# Patient Record
Sex: FEMALE | Race: WHITE | NOT HISPANIC OR LATINO
[De-identification: names, ages, dates, MRNs, and addresses within clinical notes are randomized per-mention and may not be internally consistent; named-entity substitution may affect disease eponyms.]

---

## 2018-05-03 ENCOUNTER — TRANSCRIPTION ENCOUNTER (OUTPATIENT)
Age: 48
End: 2018-05-03

## 2018-08-01 ENCOUNTER — TRANSCRIPTION ENCOUNTER (OUTPATIENT)
Age: 48
End: 2018-08-01

## 2019-02-04 PROBLEM — Z00.00 ENCOUNTER FOR PREVENTIVE HEALTH EXAMINATION: Status: ACTIVE | Noted: 2019-02-04

## 2023-07-22 ENCOUNTER — EMERGENCY (EMERGENCY)
Facility: HOSPITAL | Age: 53
LOS: 1 days | Discharge: HOME | End: 2023-07-22
Attending: EMERGENCY MEDICINE
Payer: COMMERCIAL

## 2023-07-22 VITALS
HEART RATE: 122 BPM | OXYGEN SATURATION: 95 % | TEMPERATURE: 98 F | HEIGHT: 62 IN | WEIGHT: 104.94 LBS | RESPIRATION RATE: 16 BRPM | SYSTOLIC BLOOD PRESSURE: 112 MMHG | DIASTOLIC BLOOD PRESSURE: 84 MMHG

## 2023-07-22 LAB
ALBUMIN SERPL ELPH-MCNC: 3.8 G/DL — SIGNIFICANT CHANGE UP (ref 3.3–5)
ALP SERPL-CCNC: 58 U/L — SIGNIFICANT CHANGE UP (ref 40–120)
ALT FLD-CCNC: 12 U/L — SIGNIFICANT CHANGE UP (ref 10–45)
ANION GAP SERPL CALC-SCNC: 10 MMOL/L — SIGNIFICANT CHANGE UP (ref 5–17)
APTT BLD: 33.5 SEC — SIGNIFICANT CHANGE UP (ref 27.5–35.5)
AST SERPL-CCNC: 24 U/L — SIGNIFICANT CHANGE UP (ref 10–40)
BASOPHILS # BLD AUTO: 0.04 K/UL — SIGNIFICANT CHANGE UP (ref 0–0.2)
BASOPHILS NFR BLD AUTO: 0.4 % — SIGNIFICANT CHANGE UP (ref 0–2)
BILIRUB SERPL-MCNC: 0.2 MG/DL — SIGNIFICANT CHANGE UP (ref 0.2–1.2)
BUN SERPL-MCNC: 19 MG/DL — SIGNIFICANT CHANGE UP (ref 7–23)
CALCIUM SERPL-MCNC: 9.9 MG/DL — SIGNIFICANT CHANGE UP (ref 8.4–10.5)
CHLORIDE SERPL-SCNC: 101 MMOL/L — SIGNIFICANT CHANGE UP (ref 96–108)
CO2 SERPL-SCNC: 28 MMOL/L — SIGNIFICANT CHANGE UP (ref 22–31)
CREAT SERPL-MCNC: 0.6 MG/DL — SIGNIFICANT CHANGE UP (ref 0.5–1.3)
EGFR: 109 ML/MIN/1.73M2 — SIGNIFICANT CHANGE UP
EOSINOPHIL # BLD AUTO: 0.08 K/UL — SIGNIFICANT CHANGE UP (ref 0–0.5)
EOSINOPHIL NFR BLD AUTO: 0.8 % — SIGNIFICANT CHANGE UP (ref 0–6)
GLUCOSE SERPL-MCNC: 90 MG/DL — SIGNIFICANT CHANGE UP (ref 70–99)
HCT VFR BLD CALC: 38.1 % — SIGNIFICANT CHANGE UP (ref 34.5–45)
HGB BLD-MCNC: 11.9 G/DL — SIGNIFICANT CHANGE UP (ref 11.5–15.5)
IMM GRANULOCYTES NFR BLD AUTO: 0.5 % — SIGNIFICANT CHANGE UP (ref 0–0.9)
INR BLD: 1.01 RATIO — SIGNIFICANT CHANGE UP (ref 0.88–1.16)
LYMPHOCYTES # BLD AUTO: 1.73 K/UL — SIGNIFICANT CHANGE UP (ref 1–3.3)
LYMPHOCYTES # BLD AUTO: 16.7 % — SIGNIFICANT CHANGE UP (ref 13–44)
MAGNESIUM SERPL-MCNC: 2.3 MG/DL — SIGNIFICANT CHANGE UP (ref 1.6–2.6)
MCHC RBC-ENTMCNC: 29.6 PG — SIGNIFICANT CHANGE UP (ref 27–34)
MCHC RBC-ENTMCNC: 31.2 GM/DL — LOW (ref 32–36)
MCV RBC AUTO: 94.8 FL — SIGNIFICANT CHANGE UP (ref 80–100)
MONOCYTES # BLD AUTO: 0.64 K/UL — SIGNIFICANT CHANGE UP (ref 0–0.9)
MONOCYTES NFR BLD AUTO: 6.2 % — SIGNIFICANT CHANGE UP (ref 2–14)
NEUTROPHILS # BLD AUTO: 7.82 K/UL — HIGH (ref 1.8–7.4)
NEUTROPHILS NFR BLD AUTO: 75.4 % — SIGNIFICANT CHANGE UP (ref 43–77)
NRBC # BLD: 0 /100 WBCS — SIGNIFICANT CHANGE UP (ref 0–0)
PHOSPHATE SERPL-MCNC: 4.2 MG/DL — SIGNIFICANT CHANGE UP (ref 2.5–4.5)
PLATELET # BLD AUTO: 361 K/UL — SIGNIFICANT CHANGE UP (ref 150–400)
POTASSIUM SERPL-MCNC: 4.6 MMOL/L — SIGNIFICANT CHANGE UP (ref 3.5–5.3)
POTASSIUM SERPL-SCNC: 4.6 MMOL/L — SIGNIFICANT CHANGE UP (ref 3.5–5.3)
PROT SERPL-MCNC: 7.4 G/DL — SIGNIFICANT CHANGE UP (ref 6–8.3)
PROTHROM AB SERPL-ACNC: 11.6 SEC — SIGNIFICANT CHANGE UP (ref 10.5–13.4)
RBC # BLD: 4.02 M/UL — SIGNIFICANT CHANGE UP (ref 3.8–5.2)
RBC # FLD: 13.7 % — SIGNIFICANT CHANGE UP (ref 10.3–14.5)
SODIUM SERPL-SCNC: 139 MMOL/L — SIGNIFICANT CHANGE UP (ref 135–145)
TROPONIN T, HIGH SENSITIVITY RESULT: 7 NG/L — SIGNIFICANT CHANGE UP (ref 0–51)
WBC # BLD: 10.36 K/UL — SIGNIFICANT CHANGE UP (ref 3.8–10.5)
WBC # FLD AUTO: 10.36 K/UL — SIGNIFICANT CHANGE UP (ref 3.8–10.5)

## 2023-07-22 PROCEDURE — 71275 CT ANGIOGRAPHY CHEST: CPT | Mod: 26,MA

## 2023-07-22 PROCEDURE — 74177 CT ABD & PELVIS W/CONTRAST: CPT | Mod: 26,MA

## 2023-07-22 PROCEDURE — 99223 1ST HOSP IP/OBS HIGH 75: CPT

## 2023-07-22 RX ORDER — MORPHINE SULFATE 50 MG/1
4 CAPSULE, EXTENDED RELEASE ORAL ONCE
Refills: 0 | Status: DISCONTINUED | OUTPATIENT
Start: 2023-07-22 | End: 2023-07-22

## 2023-07-22 RX ORDER — SODIUM CHLORIDE 9 MG/ML
1000 INJECTION INTRAMUSCULAR; INTRAVENOUS; SUBCUTANEOUS ONCE
Refills: 0 | Status: COMPLETED | OUTPATIENT
Start: 2023-07-22 | End: 2023-07-22

## 2023-07-22 RX ORDER — LIDOCAINE 4 G/100G
1 CREAM TOPICAL ONCE
Refills: 0 | Status: COMPLETED | OUTPATIENT
Start: 2023-07-22 | End: 2023-07-22

## 2023-07-22 RX ORDER — METHOCARBAMOL 500 MG/1
1500 TABLET, FILM COATED ORAL ONCE
Refills: 0 | Status: COMPLETED | OUTPATIENT
Start: 2023-07-22 | End: 2023-07-22

## 2023-07-22 RX ORDER — KETOROLAC TROMETHAMINE 30 MG/ML
15 SYRINGE (ML) INJECTION ONCE
Refills: 0 | Status: DISCONTINUED | OUTPATIENT
Start: 2023-07-22 | End: 2023-07-22

## 2023-07-22 RX ORDER — PANTOPRAZOLE SODIUM 20 MG/1
40 TABLET, DELAYED RELEASE ORAL ONCE
Refills: 0 | Status: COMPLETED | OUTPATIENT
Start: 2023-07-22 | End: 2023-07-22

## 2023-07-22 RX ADMIN — MORPHINE SULFATE 4 MILLIGRAM(S): 50 CAPSULE, EXTENDED RELEASE ORAL at 15:22

## 2023-07-22 RX ADMIN — MORPHINE SULFATE 4 MILLIGRAM(S): 50 CAPSULE, EXTENDED RELEASE ORAL at 14:13

## 2023-07-22 RX ADMIN — Medication 15 MILLIGRAM(S): at 21:33

## 2023-07-22 RX ADMIN — SODIUM CHLORIDE 1000 MILLILITER(S): 9 INJECTION INTRAMUSCULAR; INTRAVENOUS; SUBCUTANEOUS at 14:13

## 2023-07-22 RX ADMIN — METHOCARBAMOL 1500 MILLIGRAM(S): 500 TABLET, FILM COATED ORAL at 21:31

## 2023-07-22 RX ADMIN — PANTOPRAZOLE SODIUM 40 MILLIGRAM(S): 20 TABLET, DELAYED RELEASE ORAL at 21:34

## 2023-07-22 RX ADMIN — LIDOCAINE 1 PATCH: 4 CREAM TOPICAL at 21:31

## 2023-07-22 RX ADMIN — MORPHINE SULFATE 4 MILLIGRAM(S): 50 CAPSULE, EXTENDED RELEASE ORAL at 19:12

## 2023-07-22 NOTE — ED PROVIDER NOTE - OBJECTIVE STATEMENT
52 year old female with hx of osteoporosis, hypothyroidism, breast cancer s/p mastectomies and bilat breast implant on 7/13 comes into the ED for eval of fall last night now presenting with lower back pain. She reports feeling SOB since her surgery but recently the SOB was worse than usual. Last night she got out of bed to go to the bathroom, felt weak, her legs gave out and she fell onto her back. One of her doctors gave her a prescription for 10mg of valium and percocet which improved her pain last night. She woke up this morning in severe lower back pain. She did not take any percocet but took 10mg of valium this morning with no improvement of her pain. She denies any fevers, chills, abd pain, n/v, lower extremity numness or weakness, saddle anesthesia, bowel or bladder incontinence.

## 2023-07-22 NOTE — ED PROVIDER NOTE - PHYSICAL EXAMINATION
GENERAL: Not in acute distress, non-toxic appearing  HEAD: normocephalic, atraumatic  HEENT: EOMI, normal conjunctiva, oral mucosa moist, neck supple  CARDIAC: regular rate and rhythm, normal S1 and S2,  no appreciable murmurs  PULM: clear to ascultation bilaterally, no crackles, rales, rhonchi, or wheezing  GI: abdomen nondistended, soft, nontender, no guarding or rebound tenderness  NEURO: alert and oriented x 3, normal speech, no focal motor or sensory deficits, gait normal, no gross neurologic deficit  MSK: + midline point tenderness to the L3-L4 region, unable to active ROM RLE secondary to pain, Full passive ROM of bilat hips with no pain, No visible deformities, no peripheral edema, calf tenderness/redness/swelling  SKIN: + erythema under the right breast, dry, well-perfused  PSYCH: appropriate mood and affect

## 2023-07-22 NOTE — ED ADULT TRIAGE NOTE - HEIGHT IN CM
Her sugars are better she will continued to spot check them had her flu shot today  Occasional heartburn despite medication will add Pepcid if needed  
157.48

## 2023-07-22 NOTE — ED PROVIDER NOTE - CLINICAL SUMMARY MEDICAL DECISION MAKING FREE TEXT BOX
52 year old female with hx of osteoporosis, hypothyroidism, breast cancer s/p mastectomies and bilat breast implant on 7/13 comes into the ED for eval of fall last night now presenting with lower back pain. She reports feeling SOB since her surgery but recently the SOB was worse than usual. On exam, Pt has point tenderness to lower spine. Pt has already taken valium with no pain relief making muscle sprain less likely. Ddx includes vertebrae fx, MSK sprain, infection (abscess under left breast). ALso considering PE given hx of malignancy and recent surgery. Will order PE study as well as CT of lumbar spine along with bloodwork. 52 year old female with hx of osteoporosis, hypothyroidism, breast cancer s/p mastectomies and bilat breast implant on 7/13 comes into the ED for eval of fall last night now presenting with lower back pain. She reports feeling SOB since her surgery but recently the SOB was worse than usual. On exam, Pt has point tenderness to lower spine. Pt has already taken valium with no pain relief making muscle sprain less likely. Ddx includes vertebrae fx, MSK sprain, infection (abscess under left breast). ALso considering PE given hx of malignancy and recent surgery. Will order PE study as well as CT of lumbar spine along with bloodwork.    Dr. Jimenez (Attending Physician)

## 2023-07-23 VITALS — DIASTOLIC BLOOD PRESSURE: 69 MMHG | SYSTOLIC BLOOD PRESSURE: 103 MMHG

## 2023-07-23 LAB
APPEARANCE UR: CLEAR — SIGNIFICANT CHANGE UP
BACTERIA # UR AUTO: NEGATIVE — SIGNIFICANT CHANGE UP
BILIRUB UR-MCNC: NEGATIVE — SIGNIFICANT CHANGE UP
COLOR SPEC: COLORLESS — SIGNIFICANT CHANGE UP
DIFF PNL FLD: NEGATIVE — SIGNIFICANT CHANGE UP
EPI CELLS # UR: 1 /HPF — SIGNIFICANT CHANGE UP
GLUCOSE UR QL: NEGATIVE — SIGNIFICANT CHANGE UP
HYALINE CASTS # UR AUTO: 0 /LPF — SIGNIFICANT CHANGE UP (ref 0–2)
KETONES UR-MCNC: NEGATIVE — SIGNIFICANT CHANGE UP
LEUKOCYTE ESTERASE UR-ACNC: NEGATIVE — SIGNIFICANT CHANGE UP
NITRITE UR-MCNC: NEGATIVE — SIGNIFICANT CHANGE UP
PH UR: 7 — SIGNIFICANT CHANGE UP (ref 5–8)
PROT UR-MCNC: NEGATIVE — SIGNIFICANT CHANGE UP
RBC CASTS # UR COMP ASSIST: 0 /HPF — SIGNIFICANT CHANGE UP (ref 0–4)
SP GR SPEC: 1 — LOW (ref 1.01–1.02)
UROBILINOGEN FLD QL: NEGATIVE — SIGNIFICANT CHANGE UP
WBC UR QL: 0 /HPF — SIGNIFICANT CHANGE UP (ref 0–5)

## 2023-07-23 PROCEDURE — 85730 THROMBOPLASTIN TIME PARTIAL: CPT

## 2023-07-23 PROCEDURE — 96374 THER/PROPH/DIAG INJ IV PUSH: CPT | Mod: XU

## 2023-07-23 PROCEDURE — 87086 URINE CULTURE/COLONY COUNT: CPT

## 2023-07-23 PROCEDURE — 36415 COLL VENOUS BLD VENIPUNCTURE: CPT

## 2023-07-23 PROCEDURE — 71275 CT ANGIOGRAPHY CHEST: CPT | Mod: MA

## 2023-07-23 PROCEDURE — 85610 PROTHROMBIN TIME: CPT

## 2023-07-23 PROCEDURE — G0378: CPT

## 2023-07-23 PROCEDURE — 99238 HOSP IP/OBS DSCHRG MGMT 30/<: CPT

## 2023-07-23 PROCEDURE — 84100 ASSAY OF PHOSPHORUS: CPT

## 2023-07-23 PROCEDURE — 96375 TX/PRO/DX INJ NEW DRUG ADDON: CPT | Mod: XU

## 2023-07-23 PROCEDURE — 80053 COMPREHEN METABOLIC PANEL: CPT

## 2023-07-23 PROCEDURE — 83735 ASSAY OF MAGNESIUM: CPT

## 2023-07-23 PROCEDURE — 81001 URINALYSIS AUTO W/SCOPE: CPT

## 2023-07-23 PROCEDURE — 74177 CT ABD & PELVIS W/CONTRAST: CPT | Mod: MA

## 2023-07-23 PROCEDURE — 93005 ELECTROCARDIOGRAM TRACING: CPT

## 2023-07-23 PROCEDURE — 84484 ASSAY OF TROPONIN QUANT: CPT

## 2023-07-23 PROCEDURE — 85025 COMPLETE CBC W/AUTO DIFF WBC: CPT

## 2023-07-23 PROCEDURE — 99285 EMERGENCY DEPT VISIT HI MDM: CPT | Mod: 25

## 2023-07-23 PROCEDURE — 96376 TX/PRO/DX INJ SAME DRUG ADON: CPT | Mod: XU

## 2023-07-23 RX ORDER — SODIUM CHLORIDE 9 MG/ML
500 INJECTION INTRAMUSCULAR; INTRAVENOUS; SUBCUTANEOUS ONCE
Refills: 0 | Status: COMPLETED | OUTPATIENT
Start: 2023-07-23 | End: 2023-07-23

## 2023-07-23 RX ORDER — ACETAMINOPHEN 500 MG
725 TABLET ORAL ONCE
Refills: 0 | Status: COMPLETED | OUTPATIENT
Start: 2023-07-23 | End: 2023-07-23

## 2023-07-23 RX ORDER — KETOROLAC TROMETHAMINE 30 MG/ML
15 SYRINGE (ML) INJECTION ONCE
Refills: 0 | Status: DISCONTINUED | OUTPATIENT
Start: 2023-07-23 | End: 2023-07-23

## 2023-07-23 RX ORDER — ZOLPIDEM TARTRATE 10 MG/1
5 TABLET ORAL AT BEDTIME
Refills: 0 | Status: DISCONTINUED | OUTPATIENT
Start: 2023-07-23 | End: 2023-07-23

## 2023-07-23 RX ORDER — SODIUM CHLORIDE 9 MG/ML
1000 INJECTION INTRAMUSCULAR; INTRAVENOUS; SUBCUTANEOUS ONCE
Refills: 0 | Status: COMPLETED | OUTPATIENT
Start: 2023-07-23 | End: 2023-07-23

## 2023-07-23 RX ORDER — SODIUM CHLORIDE 9 MG/ML
1000 INJECTION INTRAMUSCULAR; INTRAVENOUS; SUBCUTANEOUS
Refills: 0 | Status: DISCONTINUED | OUTPATIENT
Start: 2023-07-23 | End: 2023-07-23

## 2023-07-23 RX ORDER — OXYCODONE HYDROCHLORIDE 5 MG/1
5 TABLET ORAL ONCE
Refills: 0 | Status: DISCONTINUED | OUTPATIENT
Start: 2023-07-23 | End: 2023-07-23

## 2023-07-23 RX ADMIN — SODIUM CHLORIDE 1000 MILLILITER(S): 9 INJECTION INTRAMUSCULAR; INTRAVENOUS; SUBCUTANEOUS at 01:51

## 2023-07-23 RX ADMIN — ZOLPIDEM TARTRATE 5 MILLIGRAM(S): 10 TABLET ORAL at 01:51

## 2023-07-23 RX ADMIN — Medication 100 MILLIGRAM(S): at 10:58

## 2023-07-23 RX ADMIN — Medication 15 MILLIGRAM(S): at 05:54

## 2023-07-23 RX ADMIN — SODIUM CHLORIDE 125 MILLILITER(S): 9 INJECTION INTRAMUSCULAR; INTRAVENOUS; SUBCUTANEOUS at 02:26

## 2023-07-23 RX ADMIN — SODIUM CHLORIDE 500 MILLILITER(S): 9 INJECTION INTRAMUSCULAR; INTRAVENOUS; SUBCUTANEOUS at 11:05

## 2023-07-23 RX ADMIN — Medication 290 MILLIGRAM(S): at 02:26

## 2023-07-23 RX ADMIN — Medication 15 MILLIGRAM(S): at 06:25

## 2023-07-23 RX ADMIN — OXYCODONE HYDROCHLORIDE 5 MILLIGRAM(S): 5 TABLET ORAL at 10:58

## 2023-07-23 NOTE — ED CDU PROVIDER DISPOSITION NOTE - PATIENT PORTAL LINK FT
You can access the FollowMyHealth Patient Portal offered by [facility] [place] by registering at the following website: [email or web address]. By joining [employer]’s FollowMyHealth portal, you will also be able to view your health information using other applications (apps) compatible with our system.

## 2023-07-23 NOTE — ED CDU PROVIDER INITIAL DAY NOTE - OBJECTIVE STATEMENT
52 year old female with hx of osteoporosis, hypothyroidism, breast cancer s/p mastectomies and bilat breast implant on 7/13 comes into the ED for eval of fall last night now presenting with lower back pain. She reports feeling SOB since her surgery but recently the SOB was worse than usual. Last night she got out of bed to go to the bathroom, felt weak, her legs gave out and she fell onto her back. One of her doctors gave her a prescription for 10mg of valium and percocet which improved her pain last night. She woke up this morning in severe lower back pain. She did not take any percocet but took 10mg of valium this morning with no improvement of her pain. She denies any fevers, chills, abd pain, n/v, lower extremity numness or weakness, saddle anesthesia, bowel or bladder incontinence. While in ED patient had labs and CT scans which were negative for acute findings. Patient to be placed in CDU for pain control and reassessment.

## 2023-07-23 NOTE — ED CDU PROVIDER SUBSEQUENT DAY NOTE - HISTORY
Patient seen at bedside in NAD. BP 82/67. IV Fluid bolus running. Patient states that she feels anxious about her pain returning and that she "just wants to get some sleep". Will give her home ambien and IV tylenol for pain. Will continue to monitor. Romina Julian PA-C

## 2023-07-23 NOTE — ED CDU PROVIDER SUBSEQUENT DAY NOTE - PROGRESS NOTE DETAILS
Patient reassessed this am. She reports persistent pain in her breasts and back. On exam small area ecchymosis and erythema inferior to R areola without associated ttp. Pt states that she has been trying to contact her surgeon and has sent pictures to the office but has not heard back.  I spoke to patients surgeon Dr. Sainz in regards to patient. She advised that she has reviewed images patient sent to their RN and has been trying to contact patient unsuccessfully. Advised will check if patient connected to wifi as this may be why she has had trouble reaching pt and if still having difficulty will provide pt phone. Dr. Sainz stated would start abx in the case that pt is developing early cellulitis and will follow with pt and determine if continued abx is needed. I-stop checked and printed report for chart. Pt w/ RX 30 percocet picked up on 7/21. Discussed w/ Dr. Su, will give 500cc bolus fluids and encourage pt to eat given BP soft. Pt w/out fevers or other source of infection, plan to send UA to ensure not missing possible UTI. Patient reassessed this am. She reports persistent pain in her breasts and back. On exam small area ecchymosis and erythema inferior to R areola without associated ttp. Pt states that she has been trying to contact her surgeon and has sent pictures to the office but has not heard back.  I spoke to patients surgeon Dr. Sainz in regards to patient. She advised that she has reviewed images patient sent to their RN and has been trying to contact patient unsuccessfully. Advised will check if patient connected to wifi as this may be why she has had trouble reaching pt and if still having difficulty will provide pt phone. Dr. Sainz stated would start abx in the case that pt is developing early cellulitis and will follow with pt and determine if continued abx is needed. I-stop checked and printed report for chart. Pt w/ RX 30 percocet picked up on 7/21. Discussed w/ Dr. Su, will give 500cc bolus fluids and encourage pt to eat given BP soft. Pt w/out fevers or other source of infection, plan to send UA to ensure not missing possible UTI. If patient feeling improved w/ stable vitals plan to d/c home w/ course of Doxy and close f/up w/ her surgeon   Sarina Pro PA-C Pt felt improved after Oxy in the CDU. BP improved and otherwise vitals stable. Plan to d/c home w/ Doxy. Pt has prescription for Percocet at home. She plans to follow up with her surgeon for reevaluation. All questions answered. Will d/c home   Sarina Pro PA-C

## 2023-07-23 NOTE — ED CDU PROVIDER DISPOSITION NOTE - CLINICAL COURSE
52 year old female with hx of osteoporosis, hypothyroidism, breast cancer s/p mastectomies and bilat breast implant on 7/13 comes into the ED for eval of fall last night now presenting with lower back pain. She reports feeling SOB since her surgery but recently the SOB was worse than usual. Last night she got out of bed to go to the bathroom, felt weak, her legs gave out and she fell onto her back. One of her doctors gave her a prescription for 10mg of valium and percocet which improved her pain last night. She woke up this morning in severe lower back pain. She did not take any percocet but took 10mg of valium this morning with no improvement of her pain. She denies any fevers, chills, abd pain, n/v, lower extremity numness or weakness, saddle anesthesia, bowel or bladder incontinence. While in ED patient had labs and CT scans which were negative for acute findings. Patient to be placed in CDU for pain control and reassessment.    While in CDU ___ 52 year old female with hx of osteoporosis, hypothyroidism, breast cancer s/p mastectomies and bilat breast implant on 7/13 comes into the ED for eval of fall last night now presenting with lower back pain. She reports feeling SOB since her surgery but recently the SOB was worse than usual. Last night she got out of bed to go to the bathroom, felt weak, her legs gave out and she fell onto her back. One of her doctors gave her a prescription for 10mg of valium and percocet which improved her pain last night. She woke up this morning in severe lower back pain. She did not take any percocet but took 10mg of valium this morning with no improvement of her pain. She denies any fevers, chills, abd pain, n/v, lower extremity numness or weakness, saddle anesthesia, bowel or bladder incontinence. While in ED patient had labs and CT scans which were negative for acute findings. Patient to be placed in CDU for pain control and reassessment.    While in CDU in the am patient with persistent pain which improved with Oxy. Pt and CDU were able to contact pts surgeon who advised to start pt on abx and will follow with her closely. Pt BP improved and she was able to tolerate PO. She was cleared for d/c after serial evaluation and was agreeable to follow up closely with her surgeon. Had prescription of Percocet at home from 7/21 for pain control

## 2023-07-23 NOTE — ED CDU PROVIDER DISPOSITION NOTE - ATTENDING APP SHARED VISIT CONTRIBUTION OF CARE
53 yo F PMHx of hypothyroidism, breast CA s/p bilat mastectomy, s/p bilat breast implants on 7/13, presenting to ED following fall at home w/ c/o low back pain. Also reported increasing dyspnea since recent surgery. Reports falling 2/2 to legs becoming weak after getting out of bed. In ED trop negative, CTA negative for PE, showing foci of sub q air at R breast, CT AP negative for acute injury, no spinal fractures seen. Overnight pt w/ soft BPs. At time of initial CDU exam in AM, pt w/ c/o R breast pain and back pain. L breast unremarkable, well healing surgical scar, R breast w/ mild erythema/induration surrounding R areola and extending laterally, no sensation in this area - consistent w/ exam since surgery per pt. Abd soft non ttp. Ambulatory w/o difficulty. Strength and sensation intact throughout extremities x 4. CTAB. Pain improving while pt in CDU and no further SOB. Spoke w/ pt's surgeon, and they advise abx for possible cellulitis and will follow up with pt on outpt basis closely. VSS stable since AM evaluation. Tolerating PO. UA negative. Given ongoing stability and improvement in pain control, plan for DC w/ abx and close outpt follow up with her surgeon, which has been discussed with her surgeon. Also advised to follow up for incidental CT findings regarding colon and lungs. Pt agreeable to plan and strict return precautions provided.

## 2023-07-23 NOTE — ED CDU PROVIDER INITIAL DAY NOTE - PROGRESS NOTE DETAILS
I informed patient of all incidental findings which include: ""Few scattered   nonspecific pulmonary nodules measuring up to 5 mm. Correlate with prior   chest CT to assess stability otherwise consider follow-up CT chest" and "Approximately 2.5 cm short segment of annular narrowing of the mid descending colon may represent a peristaltic segment however neoplasm could have this appearance. Recommend nonemergent colonoscopy". Answered all questions concerns. Romina Julian PA-C

## 2023-07-23 NOTE — ED CDU PROVIDER DISPOSITION NOTE - NSFOLLOWUPINSTRUCTIONS_ED_ALL_ED_FT
1. Follow up with your PCP within 2-3 days. Follow up with your breast surgeon within 2-3 days. Please discuss these incidental findings with your doctor:     "Few scattered   nonspecific pulmonary nodules measuring up to 5 mm. Correlate with prior   chest CT to assess stability otherwise consider follow-up CT chest"    "Approximately 2.5 cm short segment of annular narrowing of the mid   descending colon may represent a peristaltic segment however neoplasm   could have this appearance. Recommend nonemergent colonoscopy"     2. Take Ibuprofen (i.e. Motrin, Advil) 600mg every 8 hrs for pain as needed. Take with food.   May alternate with Acetminophen (Tylenol) 650mg every 6 hours for pain as needed. Take Robaxin 1-2 tablets as needed for muscle spasm.   3. Rest. Hydrate.   4. Return to the emergency department if you have worsening pain, numbness, tingling, incontinence of urine or stool, vomiting or any other concerning symptoms. 1. Please follow up with your Primary Care Doctor after discharge, bring a copy of your results to follow up appointment. Please discuss incidental findings listed below at follow up with your doctor:       "Few scattered nonspecific pulmonary nodules measuring up to 5 mm. Correlate with prior     chest CT to assess stability otherwise consider follow-up CT chest"      "Approximately 2.5 cm short segment of annular narrowing of the mid     descending colon may represent a peristaltic segment however neoplasm     could have this appearance. Recommend nonemergent colonoscopy"    2. Additionally recommend close follow up with your Breast Surgeon in the next 1-2 days for continued management. Please call office to schedule appointment     3. Take antibiotics Doxycycline, 1 tab every 12 hours for the next week unless otherwise instructed by your surgeon    4.  You may continue to take Percocet every 6 hours as needed for severe pain as previously prescribed to you. You may also take Motrin 400-600mg every 6 hours as needed for additional relief     5. Return to ED for any new or worsened symptoms of concern including worsened pain, fevers, redness, falls, weakness, dizziness and all other concerns

## 2023-07-23 NOTE — ED ADULT NURSE REASSESSMENT NOTE - NS ED NURSE REASSESS COMMENT FT1
Pt received from DAVON Bell. Pt oriented to CDU & plan of care was discussed. Pt A&O x 4. Pt in CDU for pain control . Pt verbalized constant lower back pain. see EMR for interventions. V/S as noted. 1 NS Bolus was adminstered for hypotension, pt afebrile,  IV in place, patent and free of signs of infiltration. Pt resting in bed. Safety & comfort measures maintained. Call bell in reach. Patient on cardiac monitor. HR in the 80s. NSR.
8am: pt anxious. Emotional support offered. No distress. Breathing easy and non labored.

## 2023-07-25 LAB
CULTURE RESULTS: SIGNIFICANT CHANGE UP
SPECIMEN SOURCE: SIGNIFICANT CHANGE UP

## 2023-12-08 PROBLEM — C50.919 MALIGNANT NEOPLASM OF UNSPECIFIED SITE OF UNSPECIFIED FEMALE BREAST: Chronic | Status: ACTIVE | Noted: 2023-07-23

## 2023-12-12 ENCOUNTER — OUTPATIENT (OUTPATIENT)
Dept: OUTPATIENT SERVICES | Facility: HOSPITAL | Age: 53
LOS: 1 days | End: 2023-12-12
Payer: COMMERCIAL

## 2023-12-12 VITALS
WEIGHT: 102.07 LBS | DIASTOLIC BLOOD PRESSURE: 68 MMHG | HEIGHT: 62 IN | SYSTOLIC BLOOD PRESSURE: 98 MMHG | OXYGEN SATURATION: 99 % | RESPIRATION RATE: 18 BRPM | HEART RATE: 92 BPM | TEMPERATURE: 98 F

## 2023-12-12 DIAGNOSIS — Z01.818 ENCOUNTER FOR OTHER PREPROCEDURAL EXAMINATION: ICD-10-CM

## 2023-12-12 DIAGNOSIS — Z98.890 OTHER SPECIFIED POSTPROCEDURAL STATES: Chronic | ICD-10-CM

## 2023-12-12 DIAGNOSIS — Z85.3 PERSONAL HISTORY OF MALIGNANT NEOPLASM OF BREAST: ICD-10-CM

## 2023-12-12 DIAGNOSIS — Z41.1 ENCOUNTER FOR COSMETIC SURGERY: ICD-10-CM

## 2023-12-12 DIAGNOSIS — Z90.13 ACQUIRED ABSENCE OF BILATERAL BREASTS AND NIPPLES: ICD-10-CM

## 2023-12-12 DIAGNOSIS — N65.0 DEFORMITY OF RECONSTRUCTED BREAST: ICD-10-CM

## 2023-12-12 DIAGNOSIS — Z90.13 ACQUIRED ABSENCE OF BILATERAL BREASTS AND NIPPLES: Chronic | ICD-10-CM

## 2023-12-12 LAB
A1C WITH ESTIMATED AVERAGE GLUCOSE RESULT: 4.9 % — SIGNIFICANT CHANGE UP (ref 4–5.6)
A1C WITH ESTIMATED AVERAGE GLUCOSE RESULT: 4.9 % — SIGNIFICANT CHANGE UP (ref 4–5.6)
ANION GAP SERPL CALC-SCNC: 9 MMOL/L — SIGNIFICANT CHANGE UP (ref 5–17)
ANION GAP SERPL CALC-SCNC: 9 MMOL/L — SIGNIFICANT CHANGE UP (ref 5–17)
BLD GP AB SCN SERPL QL: NEGATIVE — SIGNIFICANT CHANGE UP
BLD GP AB SCN SERPL QL: NEGATIVE — SIGNIFICANT CHANGE UP
BUN SERPL-MCNC: 17 MG/DL — SIGNIFICANT CHANGE UP (ref 7–23)
BUN SERPL-MCNC: 17 MG/DL — SIGNIFICANT CHANGE UP (ref 7–23)
CALCIUM SERPL-MCNC: 9.3 MG/DL — SIGNIFICANT CHANGE UP (ref 8.4–10.5)
CALCIUM SERPL-MCNC: 9.3 MG/DL — SIGNIFICANT CHANGE UP (ref 8.4–10.5)
CHLORIDE SERPL-SCNC: 104 MMOL/L — SIGNIFICANT CHANGE UP (ref 96–108)
CHLORIDE SERPL-SCNC: 104 MMOL/L — SIGNIFICANT CHANGE UP (ref 96–108)
CO2 SERPL-SCNC: 27 MMOL/L — SIGNIFICANT CHANGE UP (ref 22–31)
CO2 SERPL-SCNC: 27 MMOL/L — SIGNIFICANT CHANGE UP (ref 22–31)
CREAT SERPL-MCNC: 0.7 MG/DL — SIGNIFICANT CHANGE UP (ref 0.5–1.3)
CREAT SERPL-MCNC: 0.7 MG/DL — SIGNIFICANT CHANGE UP (ref 0.5–1.3)
EGFR: 104 ML/MIN/1.73M2 — SIGNIFICANT CHANGE UP
EGFR: 104 ML/MIN/1.73M2 — SIGNIFICANT CHANGE UP
ESTIMATED AVERAGE GLUCOSE: 94 MG/DL — SIGNIFICANT CHANGE UP (ref 68–114)
ESTIMATED AVERAGE GLUCOSE: 94 MG/DL — SIGNIFICANT CHANGE UP (ref 68–114)
GLUCOSE SERPL-MCNC: 83 MG/DL — SIGNIFICANT CHANGE UP (ref 70–99)
GLUCOSE SERPL-MCNC: 83 MG/DL — SIGNIFICANT CHANGE UP (ref 70–99)
HCT VFR BLD CALC: 37 % — SIGNIFICANT CHANGE UP (ref 34.5–45)
HCT VFR BLD CALC: 37 % — SIGNIFICANT CHANGE UP (ref 34.5–45)
HGB BLD-MCNC: 11.8 G/DL — SIGNIFICANT CHANGE UP (ref 11.5–15.5)
HGB BLD-MCNC: 11.8 G/DL — SIGNIFICANT CHANGE UP (ref 11.5–15.5)
MCHC RBC-ENTMCNC: 29.9 PG — SIGNIFICANT CHANGE UP (ref 27–34)
MCHC RBC-ENTMCNC: 29.9 PG — SIGNIFICANT CHANGE UP (ref 27–34)
MCHC RBC-ENTMCNC: 31.9 GM/DL — LOW (ref 32–36)
MCHC RBC-ENTMCNC: 31.9 GM/DL — LOW (ref 32–36)
MCV RBC AUTO: 93.9 FL — SIGNIFICANT CHANGE UP (ref 80–100)
MCV RBC AUTO: 93.9 FL — SIGNIFICANT CHANGE UP (ref 80–100)
NRBC # BLD: 0 /100 WBCS — SIGNIFICANT CHANGE UP (ref 0–0)
NRBC # BLD: 0 /100 WBCS — SIGNIFICANT CHANGE UP (ref 0–0)
PLATELET # BLD AUTO: 238 K/UL — SIGNIFICANT CHANGE UP (ref 150–400)
PLATELET # BLD AUTO: 238 K/UL — SIGNIFICANT CHANGE UP (ref 150–400)
POTASSIUM SERPL-MCNC: 4.4 MMOL/L — SIGNIFICANT CHANGE UP (ref 3.5–5.3)
POTASSIUM SERPL-MCNC: 4.4 MMOL/L — SIGNIFICANT CHANGE UP (ref 3.5–5.3)
POTASSIUM SERPL-SCNC: 4.4 MMOL/L — SIGNIFICANT CHANGE UP (ref 3.5–5.3)
POTASSIUM SERPL-SCNC: 4.4 MMOL/L — SIGNIFICANT CHANGE UP (ref 3.5–5.3)
RBC # BLD: 3.94 M/UL — SIGNIFICANT CHANGE UP (ref 3.8–5.2)
RBC # BLD: 3.94 M/UL — SIGNIFICANT CHANGE UP (ref 3.8–5.2)
RBC # FLD: 13.4 % — SIGNIFICANT CHANGE UP (ref 10.3–14.5)
RBC # FLD: 13.4 % — SIGNIFICANT CHANGE UP (ref 10.3–14.5)
RH IG SCN BLD-IMP: POSITIVE — SIGNIFICANT CHANGE UP
RH IG SCN BLD-IMP: POSITIVE — SIGNIFICANT CHANGE UP
SODIUM SERPL-SCNC: 139 MMOL/L — SIGNIFICANT CHANGE UP (ref 135–145)
SODIUM SERPL-SCNC: 139 MMOL/L — SIGNIFICANT CHANGE UP (ref 135–145)
WBC # BLD: 5.09 K/UL — SIGNIFICANT CHANGE UP (ref 3.8–10.5)
WBC # BLD: 5.09 K/UL — SIGNIFICANT CHANGE UP (ref 3.8–10.5)
WBC # FLD AUTO: 5.09 K/UL — SIGNIFICANT CHANGE UP (ref 3.8–10.5)
WBC # FLD AUTO: 5.09 K/UL — SIGNIFICANT CHANGE UP (ref 3.8–10.5)

## 2023-12-12 PROCEDURE — 85027 COMPLETE CBC AUTOMATED: CPT

## 2023-12-12 PROCEDURE — 80048 BASIC METABOLIC PNL TOTAL CA: CPT

## 2023-12-12 PROCEDURE — 36415 COLL VENOUS BLD VENIPUNCTURE: CPT

## 2023-12-12 PROCEDURE — 86850 RBC ANTIBODY SCREEN: CPT

## 2023-12-12 PROCEDURE — 83036 HEMOGLOBIN GLYCOSYLATED A1C: CPT

## 2023-12-12 PROCEDURE — G0463: CPT

## 2023-12-12 PROCEDURE — 86900 BLOOD TYPING SEROLOGIC ABO: CPT

## 2023-12-12 PROCEDURE — 86901 BLOOD TYPING SEROLOGIC RH(D): CPT

## 2023-12-12 RX ORDER — SODIUM CHLORIDE 9 MG/ML
3 INJECTION INTRAMUSCULAR; INTRAVENOUS; SUBCUTANEOUS EVERY 8 HOURS
Refills: 0 | Status: DISCONTINUED | OUTPATIENT
Start: 2024-01-02 | End: 2024-01-17

## 2023-12-12 RX ORDER — SODIUM CHLORIDE 9 MG/ML
1000 INJECTION, SOLUTION INTRAVENOUS
Refills: 0 | Status: DISCONTINUED | OUTPATIENT
Start: 2024-01-02 | End: 2024-01-17

## 2023-12-12 NOTE — H&P PST ADULT - HISTORY OF PRESENT ILLNESS
53 y. o female with PMH of Anxiety ,depression hypothyroidism , Insulin Resistance syndrome ( as per patient ) , Breast cancer s/p mastectomy and reconstruction in 7/23 . Presents to PST for scheduled Bilateral Prepec Conversion, Delayed Breast Implant Reconstruction With Grafting on 1/2/24.

## 2023-12-12 NOTE — H&P PST ADULT - PROBLEM SELECTOR PLAN 1
Bilateral Prepec Conversion, Delayed Breast Implant Reconstruction With Grafting on 1/2/24.  Pre- Op Instructions discussed   Labs sent

## 2023-12-12 NOTE — H&P PST ADULT - ASSESSMENT
ACTIVITY-  pt is active  able to tolerate moderate exercise without any distress  Energy Expenditure(Mets):   8.97 by dasi   Symptoms-none     Airway:  normal    Mallampati-   1    Dental: Patient denies loose teeth

## 2023-12-12 NOTE — H&P PST ADULT - NSICDXPASTSURGICALHX_GEN_ALL_CORE_FT
PAST SURGICAL HISTORY:  History of bilateral mastectomy     History of breast reconstruction     History of lumpectomy

## 2023-12-12 NOTE — H&P PST ADULT - NSICDXPASTMEDICALHX_GEN_ALL_CORE_FT
PAST MEDICAL HISTORY:  Anxiety     Breast cancer     History of insulin resistance     Hypothyroidism     Osteoporosis     Radicular pain of shoulder

## 2024-01-01 ENCOUNTER — TRANSCRIPTION ENCOUNTER (OUTPATIENT)
Age: 54
End: 2024-01-01

## 2024-01-02 ENCOUNTER — TRANSCRIPTION ENCOUNTER (OUTPATIENT)
Age: 54
End: 2024-01-02

## 2024-01-02 ENCOUNTER — OUTPATIENT (OUTPATIENT)
Dept: OUTPATIENT SERVICES | Facility: HOSPITAL | Age: 54
LOS: 1 days | End: 2024-01-02
Payer: COMMERCIAL

## 2024-01-02 VITALS
SYSTOLIC BLOOD PRESSURE: 107 MMHG | WEIGHT: 102.07 LBS | RESPIRATION RATE: 18 BRPM | OXYGEN SATURATION: 100 % | TEMPERATURE: 97 F | DIASTOLIC BLOOD PRESSURE: 73 MMHG | HEIGHT: 62 IN | HEART RATE: 69 BPM

## 2024-01-02 DIAGNOSIS — Z98.890 OTHER SPECIFIED POSTPROCEDURAL STATES: Chronic | ICD-10-CM

## 2024-01-02 DIAGNOSIS — Z90.13 ACQUIRED ABSENCE OF BILATERAL BREASTS AND NIPPLES: Chronic | ICD-10-CM

## 2024-01-02 DIAGNOSIS — N65.0 DEFORMITY OF RECONSTRUCTED BREAST: ICD-10-CM

## 2024-01-02 DIAGNOSIS — Z90.13 ACQUIRED ABSENCE OF BILATERAL BREASTS AND NIPPLES: ICD-10-CM

## 2024-01-02 DIAGNOSIS — Z85.3 PERSONAL HISTORY OF MALIGNANT NEOPLASM OF BREAST: ICD-10-CM

## 2024-01-02 PROCEDURE — 88300 SURGICAL PATH GROSS: CPT | Mod: 26,59

## 2024-01-02 PROCEDURE — 88304 TISSUE EXAM BY PATHOLOGIST: CPT | Mod: 26

## 2024-01-02 DEVICE — IMPLANTABLE DEVICE: Type: IMPLANTABLE DEVICE | Site: BILATERAL | Status: FUNCTIONAL

## 2024-01-02 RX ORDER — ACETAMINOPHEN 500 MG
700 TABLET ORAL ONCE
Refills: 0 | Status: DISCONTINUED | OUTPATIENT
Start: 2024-01-02 | End: 2024-01-02

## 2024-01-02 RX ORDER — TIRZEPATIDE 15 MG/.5ML
2.5 INJECTION, SOLUTION SUBCUTANEOUS
Refills: 0 | DISCHARGE

## 2024-01-02 RX ORDER — CHOLECALCIFEROL (VITAMIN D3) 125 MCG
1 CAPSULE ORAL
Refills: 0 | DISCHARGE

## 2024-01-02 RX ORDER — OXYCODONE HYDROCHLORIDE 5 MG/1
5 TABLET ORAL ONCE
Refills: 0 | Status: DISCONTINUED | OUTPATIENT
Start: 2024-01-02 | End: 2024-01-02

## 2024-01-02 RX ORDER — ACETAMINOPHEN 500 MG
700 TABLET ORAL ONCE
Refills: 0 | Status: COMPLETED | OUTPATIENT
Start: 2024-01-02 | End: 2024-01-02

## 2024-01-02 RX ORDER — KETOROLAC TROMETHAMINE 30 MG/ML
15 SYRINGE (ML) INJECTION ONCE
Refills: 0 | Status: DISCONTINUED | OUTPATIENT
Start: 2024-01-02 | End: 2024-01-02

## 2024-01-02 RX ORDER — CHLORHEXIDINE GLUCONATE 213 G/1000ML
1 SOLUTION TOPICAL ONCE
Refills: 0 | Status: COMPLETED | OUTPATIENT
Start: 2024-01-02 | End: 2024-01-02

## 2024-01-02 RX ORDER — APREPITANT 80 MG/1
40 CAPSULE ORAL ONCE
Refills: 0 | Status: COMPLETED | OUTPATIENT
Start: 2024-01-02 | End: 2024-01-02

## 2024-01-02 RX ORDER — ACETAMINOPHEN 500 MG
700 TABLET ORAL ONCE
Refills: 0 | Status: COMPLETED | OUTPATIENT
Start: 2024-01-03 | End: 2024-01-03

## 2024-01-02 RX ORDER — LIDOCAINE HCL 20 MG/ML
0.2 VIAL (ML) INJECTION ONCE
Refills: 0 | Status: COMPLETED | OUTPATIENT
Start: 2024-01-02 | End: 2024-01-02

## 2024-01-02 RX ORDER — DESVENLAFAXINE 50 MG/1
1 TABLET, EXTENDED RELEASE ORAL
Refills: 0 | DISCHARGE

## 2024-01-02 RX ORDER — ONDANSETRON 8 MG/1
4 TABLET, FILM COATED ORAL ONCE
Refills: 0 | Status: COMPLETED | OUTPATIENT
Start: 2024-01-02 | End: 2024-01-02

## 2024-01-02 RX ORDER — OXYCODONE HYDROCHLORIDE 5 MG/1
5 TABLET ORAL ONCE
Refills: 0 | Status: DISCONTINUED | OUTPATIENT
Start: 2024-01-02 | End: 2024-01-03

## 2024-01-02 RX ORDER — HYDROMORPHONE HYDROCHLORIDE 2 MG/ML
0.5 INJECTION INTRAMUSCULAR; INTRAVENOUS; SUBCUTANEOUS
Refills: 0 | Status: DISCONTINUED | OUTPATIENT
Start: 2024-01-02 | End: 2024-01-02

## 2024-01-02 RX ORDER — ALPRAZOLAM 0.25 MG
1 TABLET ORAL
Refills: 0 | DISCHARGE

## 2024-01-02 RX ORDER — HYDROMORPHONE HYDROCHLORIDE 2 MG/ML
0.5 INJECTION INTRAMUSCULAR; INTRAVENOUS; SUBCUTANEOUS ONCE
Refills: 0 | Status: DISCONTINUED | OUTPATIENT
Start: 2024-01-02 | End: 2024-01-03

## 2024-01-02 RX ORDER — BENZOCAINE AND MENTHOL 5; 1 G/100ML; G/100ML
1 LIQUID ORAL ONCE
Refills: 0 | Status: DISCONTINUED | OUTPATIENT
Start: 2024-01-02 | End: 2024-01-17

## 2024-01-02 RX ORDER — ZOLPIDEM TARTRATE 10 MG/1
1 TABLET ORAL
Refills: 0 | DISCHARGE

## 2024-01-02 RX ORDER — THYROID 120 MG
1 TABLET ORAL
Refills: 0 | DISCHARGE

## 2024-01-02 RX ORDER — FENTANYL CITRATE 50 UG/ML
25 INJECTION INTRAVENOUS
Refills: 0 | Status: DISCONTINUED | OUTPATIENT
Start: 2024-01-02 | End: 2024-01-02

## 2024-01-02 RX ORDER — SODIUM CHLORIDE 9 MG/ML
1000 INJECTION, SOLUTION INTRAVENOUS
Refills: 0 | Status: DISCONTINUED | OUTPATIENT
Start: 2024-01-02 | End: 2024-01-17

## 2024-01-02 RX ADMIN — FENTANYL CITRATE 25 MICROGRAM(S): 50 INJECTION INTRAVENOUS at 18:37

## 2024-01-02 RX ADMIN — SODIUM CHLORIDE 3 MILLILITER(S): 9 INJECTION INTRAMUSCULAR; INTRAVENOUS; SUBCUTANEOUS at 21:00

## 2024-01-02 RX ADMIN — CHLORHEXIDINE GLUCONATE 1 APPLICATION(S): 213 SOLUTION TOPICAL at 13:35

## 2024-01-02 RX ADMIN — FENTANYL CITRATE 25 MICROGRAM(S): 50 INJECTION INTRAVENOUS at 19:21

## 2024-01-02 RX ADMIN — HYDROMORPHONE HYDROCHLORIDE 0.5 MILLIGRAM(S): 2 INJECTION INTRAMUSCULAR; INTRAVENOUS; SUBCUTANEOUS at 17:51

## 2024-01-02 RX ADMIN — OXYCODONE HYDROCHLORIDE 5 MILLIGRAM(S): 5 TABLET ORAL at 18:37

## 2024-01-02 RX ADMIN — Medication 700 MILLIGRAM(S): at 23:00

## 2024-01-02 RX ADMIN — OXYCODONE HYDROCHLORIDE 5 MILLIGRAM(S): 5 TABLET ORAL at 18:24

## 2024-01-02 RX ADMIN — SODIUM CHLORIDE 100 MILLILITER(S): 9 INJECTION, SOLUTION INTRAVENOUS at 13:35

## 2024-01-02 RX ADMIN — Medication 15 MILLIGRAM(S): at 18:37

## 2024-01-02 RX ADMIN — Medication 0.5 MILLIGRAM(S): at 18:42

## 2024-01-02 RX ADMIN — Medication 280 MILLIGRAM(S): at 22:26

## 2024-01-02 RX ADMIN — HYDROMORPHONE HYDROCHLORIDE 0.5 MILLIGRAM(S): 2 INJECTION INTRAMUSCULAR; INTRAVENOUS; SUBCUTANEOUS at 18:11

## 2024-01-02 RX ADMIN — APREPITANT 40 MILLIGRAM(S): 80 CAPSULE ORAL at 13:36

## 2024-01-02 RX ADMIN — HYDROMORPHONE HYDROCHLORIDE 0.5 MILLIGRAM(S): 2 INJECTION INTRAMUSCULAR; INTRAVENOUS; SUBCUTANEOUS at 18:37

## 2024-01-02 RX ADMIN — FENTANYL CITRATE 25 MICROGRAM(S): 50 INJECTION INTRAVENOUS at 19:07

## 2024-01-02 RX ADMIN — Medication 15 MILLIGRAM(S): at 19:05

## 2024-01-02 RX ADMIN — FENTANYL CITRATE 25 MICROGRAM(S): 50 INJECTION INTRAVENOUS at 18:03

## 2024-01-02 RX ADMIN — FENTANYL CITRATE 25 MICROGRAM(S): 50 INJECTION INTRAVENOUS at 17:45

## 2024-01-02 RX ADMIN — ONDANSETRON 4 MILLIGRAM(S): 8 TABLET, FILM COATED ORAL at 17:51

## 2024-01-02 NOTE — PACU DISCHARGE NOTE - COMMENTS
Pt to stay overnight in PACU.  I spoke with Dr. Taylor who agreed that it would be best for her to remain monitored overnight given the continued need for pain medication.  RIGOBERTO Mercado to cover at 20:00

## 2024-01-02 NOTE — BRIEF OPERATIVE NOTE - OPERATION/FINDINGS
Bilateral implant conversion to prepectoral space with reinsertion of bilateral pectoralis muscles. Liposuction of abdomen with 10cc fat grafting to each breast. Placement of 2 drains, each breast.

## 2024-01-02 NOTE — ASU DISCHARGE PLAN (ADULT/PEDIATRIC) - NS MD DC FALL RISK RISK
For information on Fall & Injury Prevention, visit: [email or web address] OR  [email or web address] OR  https://www.cdc.gov/steadi/patient.html For information on Fall & Injury Prevention, visit: [email or web address] OR  [email or web address] OR  https://www.cdc.gov/steadi/patient.html

## 2024-01-02 NOTE — PROGRESS NOTE ADULT - SUBJECTIVE AND OBJECTIVE BOX
Post op Check    Pt seen and examined, c/o pain to surgical site 8/10, improved from 10/10 earlier. Denies CP/SOB/N/V/fever/chills.     Vital Signs Last 24 Hrs  T(C): 36.6 (02 Jan 2024 20:00), Max: 36.6 (02 Jan 2024 20:00)  T(F): 97.9 (02 Jan 2024 20:00), Max: 97.9 (02 Jan 2024 20:00)  HR: 86 (02 Jan 2024 20:00) (69 - 103)  BP: 103/66 (02 Jan 2024 20:00) (102/66 - 170/88)  BP(mean): --  RR: 18 (02 Jan 2024 20:00) (10 - 20)  SpO2: 95% (02 Jan 2024 20:00) (95% - 100%)    Parameters below as of 02 Jan 2024 20:00  Patient On (Oxygen Delivery Method): room air        I&O's Summary    02 Jan 2024 07:01  -  02 Jan 2024 21:08  --------------------------------------------------------  IN: 340 mL / OUT: 320 mL / NET: 20 mL        Physical Exam  Gen: NAD, A&Ox3  Pulm: No respiratory distress, no subcostal retractions  CV: RRR, no JVD  Abd: Soft, NT, ND  Breast:  dressing c/d/i in supportive bra  Drains: MANNY x2 30/30  Extremities:  FROM, warm and well perfused, equal bilateral muscle strength      A/P: 53y Female s/p b/l breast reconstruction   NAD.VSS.   -DVT prophylaxis w/ SCD.  Encouraged OOB  -Strict I&O's  -Monitor MANNY drain output  -Analgesia and antiemetics as needed  -Regular Diet  -Monitor overnight  -Dispo:  Discharge home in AM    Hari Mercaod PA-C  Ambulatory Surgery     Post op Check    Pt seen and examined, c/o pain to surgical site 8/10, improved from 10/10 earlier. Denies CP/SOB/N/V/fever/chills.     Vital Signs Last 24 Hrs  T(C): 36.6 (02 Jan 2024 20:00), Max: 36.6 (02 Jan 2024 20:00)  T(F): 97.9 (02 Jan 2024 20:00), Max: 97.9 (02 Jan 2024 20:00)  HR: 86 (02 Jan 2024 20:00) (69 - 103)  BP: 103/66 (02 Jan 2024 20:00) (102/66 - 170/88)  BP(mean): --  RR: 18 (02 Jan 2024 20:00) (10 - 20)  SpO2: 95% (02 Jan 2024 20:00) (95% - 100%)    Parameters below as of 02 Jan 2024 20:00  Patient On (Oxygen Delivery Method): room air        I&O's Summary    02 Jan 2024 07:01  -  02 Jan 2024 21:08  --------------------------------------------------------  IN: 340 mL / OUT: 320 mL / NET: 20 mL        Physical Exam  Gen: NAD, A&Ox3  Pulm: No respiratory distress, no subcostal retractions  CV: RRR, no JVD  Abd: Soft, NT, ND  Breast:  dressing c/d/i in supportive bra  Drains: MANNY x2 30/30  Extremities:  FROM, warm and well perfused, equal bilateral muscle strength      A/P: 53y Female s/p b/l breast reconstruction   NAD.VSS.   -DVT prophylaxis w/ SCD.  Encouraged OOB  -Strict I&O's  -Monitor MANNY drain output  -Analgesia and antiemetics as needed  -Regular Diet  -Monitor overnight  -Dispo:  Discharge home in AM    Hari Mercado PA-C  Ambulatory Surgery     Post op Check    Pt seen and examined, c/o pain to surgical site 8/10, improved from 10/10 earlier. Denies CP/SOB/N/V/fever/chills.     Vital Signs Last 24 Hrs  T(C): 36.6 (02 Jan 2024 20:00), Max: 36.6 (02 Jan 2024 20:00)  T(F): 97.9 (02 Jan 2024 20:00), Max: 97.9 (02 Jan 2024 20:00)  HR: 86 (02 Jan 2024 20:00) (69 - 103)  BP: 103/66 (02 Jan 2024 20:00) (102/66 - 170/88)  BP(mean): --  RR: 18 (02 Jan 2024 20:00) (10 - 20)  SpO2: 95% (02 Jan 2024 20:00) (95% - 100%)    Parameters below as of 02 Jan 2024 20:00  Patient On (Oxygen Delivery Method): room air         I&O's Summary    02 Jan 2024 07:01  -  02 Jan 2024 21:08  --------------------------------------------------------  IN: 340 mL / OUT: 320 mL / NET: 20 mL        Physical Exam  Gen: NAD, A&Ox3  Pulm: No respiratory distress, no subcostal retractions  CV: RRR, no JVD  Abd: Soft, NT, ND  Breast:  dressing c/d/i in supportive bra  Drains: MANNY x2 30/30  Extremities:  FROM, warm and well perfused, equal bilateral muscle strength      A/P: 53y Female s/p b/l breast reconstruction   NAD.VSS.   -DVT prophylaxis w/ SCD.  Encouraged OOB  -Strict I&O's  -Monitor MANNY drain output  -Analgesia and antiemetics as needed  -Regular Diet  -Monitor overnight  -Dispo:  Discharge home in AM    Hari Mercado PA-C  Ambulatory Surgery

## 2024-01-02 NOTE — ASU PATIENT PROFILE, ADULT - FALL HARM RISK - UNIVERSAL INTERVENTIONS
Bed in lowest position, wheels locked, appropriate side rails in place/Call bell, personal items and telephone in reach/Instruct patient to call for assistance before getting out of bed or chair/Non-slip footwear when patient is out of bed/[place] to call system/Physically safe environment - no spills, clutter or unnecessary equipment/Purposeful Proactive Rounding/Room/bathroom lighting operational, light cord in reach Bed in lowest position, wheels locked, appropriate side rails in place/Call bell, personal items and telephone in reach/Instruct patient to call for assistance before getting out of bed or chair/Non-slip footwear when patient is out of bed/[place] to call system/Physically safe environment - no spills, clutter or unnecessary equipment/Purposeful Proactive Rounding/Room/bathroom lighting operational, light cord in reach

## 2024-01-02 NOTE — ASU DISCHARGE PLAN (ADULT/PEDIATRIC) - CARE PROVIDER_API CALL
Darnell Taylor  Plastic Surgery  [address], [address]  [place] [address]  Phone: [phone]  Fax: [phone]  Follow Up Time: 1 week   Darnell Taylor  Plastic Surgery  [address], [address]  [place] [address]  Phone: [phone]  Fax: [phone]  Follow Up Time: 1 week

## 2024-01-02 NOTE — ASU PREOP CHECKLIST - LOOSE TEETH
History     Chief Complaint   Patient presents with     Ankle Pain     HPI  Shukri Smith is a 37 year old female with history of asthma clinic evaluation of a fall and injury to her right ankle.  She was trying out an electric hover board when she lost her balance and stepped off abruptly.  She felt a pop and had immediate sharp pain in her ankle.  Has not been able to ambulate since.  Has pain and swelling in the ankle and feels that her ankle is not in its normal location.  No other injuries.  Denies numbness or weakness.  Did take ibuprofen 600 mg before the fall for headache.    Allergies:  Allergies   Allergen Reactions     Sulfamethoxazole-Trimethoprim [Sulfamethoxazole-Trimethoprim] Rash       Problem List:    Patient Active Problem List    Diagnosis Date Noted     Asthma      Priority: Medium     Created by Conversion         Segmental Dysfunction Of Thoracic Region      Priority: Medium     Created by Conversion  Replacement Utility updated for latest IMO load         Segmental Dysfunction Of Upper Extremities      Priority: Medium     Created by Conversion  Replacement Utility updated for latest IMO load         Human Papilloma Virus Infection      Priority: Medium     Created by Conversion         Abnormal Pap Smear Of Cervix      Priority: Medium     Created by Conversion         Allergies      Priority: Medium     Created by Conversion         Dysplastic Nevus      Priority: Medium     Created by Conversion  [facility] Annotation: Mar  4 2013  3:35PM - Venessa Ramos: 5/09 mole L   breast         Obesity      Priority: Medium     Created by Conversion         Chronic Pain Due To Trauma      Priority: Medium     Created by Conversion         Motion Sickness      Priority: Medium     Created by Conversion            Past Medical History:    No past medical history on file.    Past Surgical History:    No past surgical history on file.    Family History:    No family history on file.    Social  "History:  Marital Status:   [2]  Social History     Tobacco Use     Smoking status: Never        Medications:    acetaminophen (TYLENOL) 500 MG tablet  ibuprofen (ADVIL/MOTRIN) 200 MG tablet  albuterol (PROVENTIL HFA;VENTOLIN HFA) 90 mcg/actuation inhaler  calcium carbonate-vitamin D2 500 mg(1,250mg) -200 unit tablet  docosahexanoic acid (PRENATAL DHA) 200 mg cap  folic acid (FOLVITE) 400 MCG tablet  naphazoline-pheniramine (NAPHCON-A) 0.57967-2.315 % Drop ophthalmic solution          Review of Systems   Constitutional: Negative for appetite change and fever.   Respiratory: Negative for shortness of breath.    Cardiovascular: Negative for chest pain.   Gastrointestinal: Negative for abdominal pain.   Musculoskeletal: Positive for joint swelling (right ankle).        Right ankle pain and swelling   Skin: Negative for wound.   Neurological: Negative for numbness.   All other systems reviewed and are negative.      Physical Exam   BP: 125/75  Pulse: 75  Temp: 97.9  F (36.6  C)  Resp: 16  Height: 160 cm (5' 3\")  Weight: 93 kg (205 lb)  SpO2: 100 %      Physical Exam  Vitals and nursing note reviewed.   Constitutional:       Appearance: Normal appearance. She is not ill-appearing or diaphoretic.      Comments: Patient notably uncomfortable but nontoxic.  Complained of severe pain in her ankle   HENT:      Mouth/Throat:      Mouth: Mucous membranes are moist.   Cardiovascular:      Rate and Rhythm: Normal rate.      Pulses: Normal pulses.   Pulmonary:      Effort: Pulmonary effort is normal.   Musculoskeletal:      Right ankle: Swelling and deformity present. Tenderness (Diffuse) present. No base of 5th metatarsal or proximal fibula tenderness. Decreased range of motion.   Skin:     General: Skin is warm and dry.      Capillary Refill: Capillary refill takes less than 2 seconds.   Neurological:      Mental Status: She is alert and oriented to person, place, and time.   Psychiatric:         Mood and Affect: Mood " normal.         ED Course                 [facility]    -Dislocation - Lower Extremity    Date/Time: 5/19/2023 11:44 PM    Performed by: Shyam Schmid MD  Authorized by: Shyam Schmid MD    Risks, benefits and alternatives discussed.      LOCATION     Location:  Ankle    Ankle injury location:  R ankle    Ankle dislocation type: lateral      PRE PROCEDURE DETAILS:     Distal perfusion: normal      Range of motion: reduced      ANESTHESIA (see MAR for exact dosages)      Anesthesia method: propofol - see anesthesia notes.    PROCEDURE DETAILS      Manipulation performed: yes      Ankle reduction method:  Direct traction    Reduction successful: yes      Reduction confirmed with imaging: yes      Immobilization:  Splint    Splint type:  Short leg (with ankle stirrup)    Supplies used:  Elastic bandage and Ortho-Glass    POST PROCEDURE DETAILS      Neurological function comment:  Slight tingling in 4th toe    Distal perfusion: normal      Distal perfusion comment:  Brisk capillary refill    Range of motion: unchanged        PROCEDURE    Patient Tolerance:  Patient tolerated the procedure well with no immediate complications                  Results for orders placed or performed during the hospital encounter of 05/19/23 (from the past 24 hour(s))   Ankle XR, G/E 3 views, right    Narrative    EXAM: XR ANKLE RIGHT G/E 3 VIEWS  LOCATION: [facility]  DATE/TIME: 5/19/2023 9:58 PM CDT    INDICATION: Right ankle pain after fall.  COMPARISON: None.      Impression    IMPRESSION:     Mildly displaced oblique fracture of the distal fibular diaphysis. The distal fracture fragment is laterally displaced by 0.4 cm.    Widening of the medial clear space and the distal tibiofibular syndesmosis, compatible with ligamentous injury. Lateral subluxation of the talar dome relative to the tibial plafond.    Soft tissue swelling about the ankle.   XR Ankle Right  2 Views    Narrative    EXAM: XR ANKLE RIGHT 2 VIEWS  LOCATION: [facility]  DATE/TIME: 5/19/2023 11:57 PM CDT    INDICATION: post right ankle reduction  COMPARISON: Prior study obtained on 05/19/2023      Impression    IMPRESSION:   Splint material has been placed which limits fine bony detail evaluation. The comminuted distal fibular fracture is again seen with minimally improved alignment. There is persistent but improved medial clear space widening and improved alignment of the   distal tibiofibular syndesmosis as well as the ankle mortise.       Medications   HYDROmorphone (PF) (DILAUDID) injection 0.5 mg (has no administration in time range)   fentaNYL (PF) (SUBLIMAZE) injection 100 mcg (has no administration in time range)   fentaNYL (PF) (SUBLIMAZE) injection  mcg (100 mcg Intravenous $Given 5/19/23 223)     12:23 AM Patient re-assessed: Patient resting comfortably.  Pain dramatically improved after splinting.  Reviewed repeat x-ray showing improved alignment.  Patient has toe rings which ED tech is trying to remove now.  Family reports they have a new set of crutches at home that patient can use so do not need crutches now.  Patient advised of the need to be nonweightbearing due to the instability of her ankle.    Assessments & Plan (with Medical Decision Making)  37-year-old female presenting for evaluation of injury to her right ankle.  Was rolling on a electric hover board when she stepped off and injured her ankle.  She suffered a distal fibular fracture as well as a likely ligamentous injury to her ankle causing a subluxation of the joint.  Patient stated and ankle reduced manually with improved positioning on repeat x-ray.  Splinted as above with improvement in her pain.  Patient advised to be nonweightbearing and utilize crutches which they have at home.  Discharged home in improved condition with plan for continued symptomatic treatment and follow-up with  orthopedics within one week     I have reviewed the nursing notes.    I have reviewed the findings, diagnosis, plan and need for follow up with the patient.           New Prescriptions    ACETAMINOPHEN (TYLENOL) 500 MG TABLET    Take 2 tablets (1,000 mg) by mouth every 8 hours as needed for fever or pain    IBUPROFEN (ADVIL/MOTRIN) 200 MG TABLET    Take 4 tablets (800 mg) by mouth every 8 hours as needed for pain       Final diagnoses:   Ankle fracture, right, closed, initial encounter       5/19/2023   [facility] EMERGENCY DEPT     Schmid, Shyam Hough MD  05/20/23 0027     no

## 2024-01-02 NOTE — BRIEF OPERATIVE NOTE - NSICDXBRIEFPROCEDURE_GEN_ALL_CORE_FT
PROCEDURES:  Replacement of breast prosthesis in prepectoral space 02-Jan-2024 21:42:28  Sky Sommer  Liposuction, abdomen 02-Jan-2024 21:42:40  Sky Sommer  Fat grafting 02-Jan-2024 21:42:56  Sky Sommer

## 2024-01-02 NOTE — ASU DISCHARGE PLAN (ADULT/PEDIATRIC) - PROVIDER TOKENS
PROVIDER:[TOKEN:[[id number]],FOLLOWUP:[1 week]] PROVIDER:[TOKEN:[[id number]],FOLLOWUP:[1 week]]

## 2024-01-02 NOTE — ASU DISCHARGE PLAN (ADULT/PEDIATRIC) - NURSING INSTRUCTIONS
If needed you can take Tylenol 650mg-1000mg every 6 hours as needed for pain again at 8pm. You can alternate with Ibuprofen three hours after Tylenol, take Ibuprofen/advil/motrin 600mg at 11pm. You were given Tylenol during your visit, the next dose of Tylenol will be on or after _____2:00 PM______ ,today/tonight and every 6 hours afterwards for pain management, do not take any Tylenol containing products until this time. Do not exceed more than 4000mg of Tylenol in one 24 hour setting.

## 2024-01-03 VITALS
RESPIRATION RATE: 17 BRPM | HEART RATE: 65 BPM | DIASTOLIC BLOOD PRESSURE: 58 MMHG | SYSTOLIC BLOOD PRESSURE: 90 MMHG | OXYGEN SATURATION: 98 % | TEMPERATURE: 98 F

## 2024-01-03 PROCEDURE — 15771 GRFG AUTOL FAT LIPO 50 CC/<: CPT

## 2024-01-03 PROCEDURE — 86850 RBC ANTIBODY SCREEN: CPT

## 2024-01-03 PROCEDURE — C1789: CPT

## 2024-01-03 PROCEDURE — 88304 TISSUE EXAM BY PATHOLOGIST: CPT

## 2024-01-03 PROCEDURE — 99261: CPT

## 2024-01-03 PROCEDURE — 19380 REVJ RECONSTRUCTED BREAST: CPT | Mod: 50,XU

## 2024-01-03 PROCEDURE — 88300 SURGICAL PATH GROSS: CPT

## 2024-01-03 PROCEDURE — C9399: CPT

## 2024-01-03 PROCEDURE — 19342 INSJ/RPLCMT BRST IMPLT SEP D: CPT | Mod: 50

## 2024-01-03 PROCEDURE — 15772 GRFG AUTOL FAT LIPO EA ADDL: CPT

## 2024-01-03 PROCEDURE — 15777 ACELLULAR DERM MATRIX IMPLT: CPT

## 2024-01-03 PROCEDURE — 86901 BLOOD TYPING SEROLOGIC RH(D): CPT

## 2024-01-03 PROCEDURE — 86900 BLOOD TYPING SEROLOGIC ABO: CPT

## 2024-01-03 RX ADMIN — HYDROMORPHONE HYDROCHLORIDE 0.5 MILLIGRAM(S): 2 INJECTION INTRAMUSCULAR; INTRAVENOUS; SUBCUTANEOUS at 04:36

## 2024-01-03 RX ADMIN — SODIUM CHLORIDE 3 MILLILITER(S): 9 INJECTION INTRAMUSCULAR; INTRAVENOUS; SUBCUTANEOUS at 06:39

## 2024-01-03 RX ADMIN — Medication 700 MILLIGRAM(S): at 06:43

## 2024-01-03 RX ADMIN — HYDROMORPHONE HYDROCHLORIDE 0.5 MILLIGRAM(S): 2 INJECTION INTRAMUSCULAR; INTRAVENOUS; SUBCUTANEOUS at 03:51

## 2024-01-03 RX ADMIN — Medication 280 MILLIGRAM(S): at 06:43

## 2024-01-03 RX ADMIN — OXYCODONE HYDROCHLORIDE 5 MILLIGRAM(S): 5 TABLET ORAL at 01:00

## 2024-01-03 RX ADMIN — OXYCODONE HYDROCHLORIDE 5 MILLIGRAM(S): 5 TABLET ORAL at 00:25

## 2024-04-19 NOTE — ED ADULT TRIAGE NOTE - PATIENT ON (OXYGEN DELIVERY METHOD)
Patient had mechanical fall and was down on the ground x2 days   - BUN/Cr 22/1.14 CK 2336 -> 2002   - UA +blood. F/u repeat labs   - Medicine consulted for co-management
room air

## 2024-10-08 PROBLEM — Z86.39 PERSONAL HISTORY OF OTHER ENDOCRINE, NUTRITIONAL AND METABOLIC DISEASE: Chronic | Status: ACTIVE | Noted: 2023-12-12

## 2024-10-08 PROBLEM — M81.0 AGE-RELATED OSTEOPOROSIS WITHOUT CURRENT PATHOLOGICAL FRACTURE: Chronic | Status: ACTIVE | Noted: 2023-12-12

## 2024-10-08 PROBLEM — F41.9 ANXIETY DISORDER, UNSPECIFIED: Chronic | Status: ACTIVE | Noted: 2023-12-12

## 2024-10-08 PROBLEM — M54.12 RADICULOPATHY, CERVICAL REGION: Chronic | Status: ACTIVE | Noted: 2023-12-12

## 2024-10-08 PROBLEM — E03.9 HYPOTHYROIDISM, UNSPECIFIED: Chronic | Status: ACTIVE | Noted: 2023-12-12

## 2024-10-10 ENCOUNTER — NON-APPOINTMENT (OUTPATIENT)
Age: 54
End: 2024-10-10

## 2024-10-14 ENCOUNTER — APPOINTMENT (OUTPATIENT)
Dept: OTOLARYNGOLOGY | Facility: CLINIC | Age: 54
End: 2024-10-14
Payer: COMMERCIAL

## 2024-10-14 VITALS — BODY MASS INDEX: 19.32 KG/M2 | WEIGHT: 105 LBS | HEIGHT: 62 IN

## 2024-10-14 DIAGNOSIS — R51.9 HEADACHE, UNSPECIFIED: ICD-10-CM

## 2024-10-14 DIAGNOSIS — Z80.9 FAMILY HISTORY OF MALIGNANT NEOPLASM, UNSPECIFIED: ICD-10-CM

## 2024-10-14 DIAGNOSIS — Z85.9 PERSONAL HISTORY OF MALIGNANT NEOPLASM, UNSPECIFIED: ICD-10-CM

## 2024-10-14 DIAGNOSIS — Z83.3 FAMILY HISTORY OF DIABETES MELLITUS: ICD-10-CM

## 2024-10-14 DIAGNOSIS — Z86.39 PERSONAL HISTORY OF OTHER ENDOCRINE, NUTRITIONAL AND METABOLIC DISEASE: ICD-10-CM

## 2024-10-14 DIAGNOSIS — Z82.49 FAMILY HISTORY OF ISCHEMIC HEART DISEASE AND OTHER DISEASES OF THE CIRCULATORY SYSTEM: ICD-10-CM

## 2024-10-14 PROCEDURE — 99202 OFFICE O/P NEW SF 15 MIN: CPT

## 2024-10-14 RX ORDER — THYROID, PORCINE 90 MG/1
TABLET ORAL
Refills: 0 | Status: ACTIVE | COMMUNITY

## 2024-10-14 RX ORDER — DESVENLAFAXINE SUCCINATE 25 MG/1
TABLET, EXTENDED RELEASE ORAL
Refills: 0 | Status: ACTIVE | COMMUNITY

## 2024-10-14 RX ORDER — ALPRAZOLAM 2 MG/1
TABLET ORAL
Refills: 0 | Status: ACTIVE | COMMUNITY

## 2024-10-14 RX ORDER — ZOLPIDEM TARTRATE 5 MG/1
TABLET, FILM COATED ORAL
Refills: 0 | Status: ACTIVE | COMMUNITY

## 2024-12-10 NOTE — ED CDU PROVIDER DISPOSITION NOTE - ATTENDING SHARED VISIT SELECTORS
Detail Level: Detailed Depth Of Biopsy: dermis Was A Bandage Applied: Yes Size Of Lesion In Cm: 0.8 X Size Of Lesion In Cm: 0 Biopsy Type: H and E Biopsy Method: Dermablade Anesthesia Type: 1% lidocaine with epinephrine Anesthesia Volume In Cc: 0.5 Hemostasis: Drysol History/Exam/Medical Decision Making Wound Care: Petrolatum Dressing: bandage Destruction After The Procedure: No Type Of Destruction Used: Curettage Curettage Text: The wound bed was treated with curettage after the biopsy was performed. Cryotherapy Text: The wound bed was treated with cryotherapy after the biopsy was performed. Electrodesiccation Text: The wound bed was treated with electrodesiccation after the biopsy was performed. Electrodesiccation And Curettage Text: The wound bed was treated with electrodesiccation and curettage after the biopsy was performed. Silver Nitrate Text: The wound bed was treated with silver nitrate after the biopsy was performed. Lab: [id number] Lab Facility: [id number] Medical Necessity Information: It is in your best interest to select a reason for this procedure from the list below. All of these items fulfill various CMS LCD requirements except the new and changing color options. Consent: Written consent was obtained and risks were reviewed including but not limited to scarring, infection, bleeding, scabbing, incomplete removal, nerve damage and allergy to anesthesia. Post-Care Instructions: I reviewed with the patient in detail post-care instructions. Patient is to keep the biopsy site dry overnight, and then apply bacitracin twice daily until healed. Patient may apply hydrogen peroxide soaks to remove any crusting. Notification Instructions: Patient will be notified of biopsy results. However, patient instructed to call the office if not contacted within 2 weeks. Billing Type: Third-Party Bill Information: Selecting Yes will display possible errors in your note based on the variables you have selected. This validation is only offered as a suggestion for you. PLEASE NOTE THAT THE VALIDATION TEXT WILL BE REMOVED WHEN YOU FINALIZE YOUR NOTE. IF YOU WANT TO FAX A PRELIMINARY NOTE YOU WILL NEED TO TOGGLE THIS TO 'NO' IF YOU DO NOT WANT IT IN YOUR FAXED NOTE. Size Of Lesion In Cm: 1.2 Lab: [id number] Lab Facility: [id number]

## 2025-01-21 ENCOUNTER — APPOINTMENT (OUTPATIENT)
Dept: ULTRASOUND IMAGING | Facility: HOSPITAL | Age: 55
End: 2025-01-21

## 2025-01-21 ENCOUNTER — APPOINTMENT (OUTPATIENT)
Dept: SURGICAL ONCOLOGY | Facility: CLINIC | Age: 55
End: 2025-01-21
Payer: COMMERCIAL

## 2025-01-21 ENCOUNTER — TRANSCRIPTION ENCOUNTER (OUTPATIENT)
Age: 55
End: 2025-01-21

## 2025-01-21 ENCOUNTER — RESULT REVIEW (OUTPATIENT)
Age: 55
End: 2025-01-21

## 2025-01-21 ENCOUNTER — OUTPATIENT (OUTPATIENT)
Dept: OUTPATIENT SERVICES | Facility: HOSPITAL | Age: 55
LOS: 1 days | End: 2025-01-21
Payer: COMMERCIAL

## 2025-01-21 VITALS
HEIGHT: 62 IN | RESPIRATION RATE: 16 BRPM | SYSTOLIC BLOOD PRESSURE: 95 MMHG | HEART RATE: 88 BPM | TEMPERATURE: 98.2 F | BODY MASS INDEX: 18.95 KG/M2 | WEIGHT: 103 LBS | OXYGEN SATURATION: 98 % | DIASTOLIC BLOOD PRESSURE: 69 MMHG

## 2025-01-21 DIAGNOSIS — Z86.39 PERSONAL HISTORY OF OTHER ENDOCRINE, NUTRITIONAL AND METABOLIC DISEASE: ICD-10-CM

## 2025-01-21 DIAGNOSIS — Z80.9 FAMILY HISTORY OF MALIGNANT NEOPLASM, UNSPECIFIED: ICD-10-CM

## 2025-01-21 DIAGNOSIS — Z80.51 FAMILY HISTORY OF MALIGNANT NEOPLASM OF KIDNEY: ICD-10-CM

## 2025-01-21 DIAGNOSIS — Z80.42 FAMILY HISTORY OF MALIGNANT NEOPLASM OF PROSTATE: ICD-10-CM

## 2025-01-21 DIAGNOSIS — Z98.890 OTHER SPECIFIED POSTPROCEDURAL STATES: Chronic | ICD-10-CM

## 2025-01-21 DIAGNOSIS — Z82.49 FAMILY HISTORY OF ISCHEMIC HEART DISEASE AND OTHER DISEASES OF THE CIRCULATORY SYSTEM: ICD-10-CM

## 2025-01-21 DIAGNOSIS — K82.8 OTHER SPECIFIED DISEASES OF GALLBLADDER: ICD-10-CM

## 2025-01-21 DIAGNOSIS — Z83.3 FAMILY HISTORY OF DIABETES MELLITUS: ICD-10-CM

## 2025-01-21 DIAGNOSIS — R10.13 EPIGASTRIC PAIN: ICD-10-CM

## 2025-01-21 DIAGNOSIS — Z85.3 PERSONAL HISTORY OF MALIGNANT NEOPLASM OF BREAST: ICD-10-CM

## 2025-01-21 PROCEDURE — 99204 OFFICE O/P NEW MOD 45 MIN: CPT

## 2025-01-21 PROCEDURE — 76705 ECHO EXAM OF ABDOMEN: CPT

## 2025-01-21 PROCEDURE — 76705 ECHO EXAM OF ABDOMEN: CPT | Mod: 26

## 2025-01-21 RX ORDER — ESOMEPRAZOLE MAGNESIUM 40 MG/1
40 GRANULE, DELAYED RELEASE ORAL
Refills: 0 | Status: ACTIVE | COMMUNITY

## 2025-01-21 RX ORDER — ONDANSETRON HYDROCHLORIDE 4 MG/1
TABLET, FILM COATED ORAL
Refills: 0 | Status: ACTIVE | COMMUNITY

## 2025-01-21 RX ORDER — CARBAMAZEPINE 200 MG/1
200 TABLET ORAL
Refills: 0 | Status: ACTIVE | COMMUNITY

## 2025-01-21 RX ORDER — ZOLPIDEM TARTRATE 12.5 MG/1
12.5 TABLET, COATED ORAL
Refills: 0 | Status: ACTIVE | COMMUNITY

## 2025-01-21 RX ORDER — DESVENLAFAXINE SUCCINATE 50 MG/1
50 TABLET, EXTENDED RELEASE ORAL
Refills: 0 | Status: ACTIVE | COMMUNITY

## 2025-01-21 RX ORDER — MAGNESIUM OXIDE/MAG AA CHELATE 300 MG
CAPSULE ORAL
Refills: 0 | Status: ACTIVE | COMMUNITY

## 2025-01-21 RX ORDER — TIRZEPATIDE 2.5 MG/.5ML
2.5 INJECTION, SOLUTION SUBCUTANEOUS
Refills: 0 | Status: ACTIVE | COMMUNITY

## 2025-01-26 ENCOUNTER — INPATIENT (INPATIENT)
Facility: HOSPITAL | Age: 55
LOS: 3 days | Discharge: HOME | DRG: 419 | End: 2025-01-30
Attending: SURGERY | Admitting: SURGERY
Payer: COMMERCIAL

## 2025-01-26 VITALS
DIASTOLIC BLOOD PRESSURE: 84 MMHG | HEART RATE: 85 BPM | RESPIRATION RATE: 18 BRPM | SYSTOLIC BLOOD PRESSURE: 120 MMHG | TEMPERATURE: 97 F | HEIGHT: 62 IN | OXYGEN SATURATION: 98 % | WEIGHT: 102.96 LBS

## 2025-01-26 DIAGNOSIS — Z98.890 OTHER SPECIFIED POSTPROCEDURAL STATES: Chronic | ICD-10-CM

## 2025-01-26 DIAGNOSIS — Z90.13 ACQUIRED ABSENCE OF BILATERAL BREASTS AND NIPPLES: Chronic | ICD-10-CM

## 2025-01-26 LAB
ALBUMIN SERPL ELPH-MCNC: 4.5 G/DL — SIGNIFICANT CHANGE UP (ref 3.3–5)
ALP SERPL-CCNC: 154 U/L — HIGH (ref 40–120)
ALT FLD-CCNC: 56 U/L — HIGH (ref 10–45)
ANION GAP SERPL CALC-SCNC: 8 MMOL/L — SIGNIFICANT CHANGE UP (ref 5–17)
APPEARANCE UR: ABNORMAL
AST SERPL-CCNC: 140 U/L — HIGH (ref 10–40)
BASOPHILS # BLD AUTO: 0.06 K/UL — SIGNIFICANT CHANGE UP (ref 0–0.2)
BASOPHILS NFR BLD AUTO: 0.7 % — SIGNIFICANT CHANGE UP (ref 0–2)
BILIRUB SERPL-MCNC: 0.3 MG/DL — SIGNIFICANT CHANGE UP (ref 0.2–1.2)
BILIRUB UR-MCNC: NEGATIVE — SIGNIFICANT CHANGE UP
BUN SERPL-MCNC: 14 MG/DL — SIGNIFICANT CHANGE UP (ref 7–23)
CALCIUM SERPL-MCNC: 9.8 MG/DL — SIGNIFICANT CHANGE UP (ref 8.4–10.5)
CHLORIDE SERPL-SCNC: 101 MMOL/L — SIGNIFICANT CHANGE UP (ref 96–108)
CO2 SERPL-SCNC: 32 MMOL/L — HIGH (ref 22–31)
COLOR SPEC: YELLOW — SIGNIFICANT CHANGE UP
CREAT SERPL-MCNC: 0.7 MG/DL — SIGNIFICANT CHANGE UP (ref 0.5–1.3)
DIFF PNL FLD: NEGATIVE — SIGNIFICANT CHANGE UP
EGFR: 103 ML/MIN/1.73M2 — SIGNIFICANT CHANGE UP
EOSINOPHIL # BLD AUTO: 0.11 K/UL — SIGNIFICANT CHANGE UP (ref 0–0.5)
EOSINOPHIL NFR BLD AUTO: 1.3 % — SIGNIFICANT CHANGE UP (ref 0–6)
GLUCOSE SERPL-MCNC: 107 MG/DL — HIGH (ref 70–99)
GLUCOSE UR QL: NEGATIVE MG/DL — SIGNIFICANT CHANGE UP
HCG SERPL-ACNC: 2 MIU/ML — SIGNIFICANT CHANGE UP
HCT VFR BLD CALC: 43.8 % — SIGNIFICANT CHANGE UP (ref 34.5–45)
HGB BLD-MCNC: 13.2 G/DL — SIGNIFICANT CHANGE UP (ref 11.5–15.5)
IMM GRANULOCYTES NFR BLD AUTO: 0.4 % — SIGNIFICANT CHANGE UP (ref 0–0.9)
KETONES UR-MCNC: NEGATIVE MG/DL — SIGNIFICANT CHANGE UP
LACTATE SERPL-SCNC: 1.1 MMOL/L — SIGNIFICANT CHANGE UP (ref 0.5–2)
LEUKOCYTE ESTERASE UR-ACNC: ABNORMAL
LIDOCAIN IGE QN: 39 U/L — SIGNIFICANT CHANGE UP (ref 7–60)
LYMPHOCYTES # BLD AUTO: 1.4 K/UL — SIGNIFICANT CHANGE UP (ref 1–3.3)
LYMPHOCYTES # BLD AUTO: 16.7 % — SIGNIFICANT CHANGE UP (ref 13–44)
MCHC RBC-ENTMCNC: 28.4 PG — SIGNIFICANT CHANGE UP (ref 27–34)
MCHC RBC-ENTMCNC: 30.1 G/DL — LOW (ref 32–36)
MCV RBC AUTO: 94.4 FL — SIGNIFICANT CHANGE UP (ref 80–100)
MONOCYTES # BLD AUTO: 0.58 K/UL — SIGNIFICANT CHANGE UP (ref 0–0.9)
MONOCYTES NFR BLD AUTO: 6.9 % — SIGNIFICANT CHANGE UP (ref 2–14)
NEUTROPHILS # BLD AUTO: 6.22 K/UL — SIGNIFICANT CHANGE UP (ref 1.8–7.4)
NEUTROPHILS NFR BLD AUTO: 74 % — SIGNIFICANT CHANGE UP (ref 43–77)
NITRITE UR-MCNC: NEGATIVE — SIGNIFICANT CHANGE UP
NRBC # BLD: 0 /100 WBCS — SIGNIFICANT CHANGE UP (ref 0–0)
NRBC BLD-RTO: 0 /100 WBCS — SIGNIFICANT CHANGE UP (ref 0–0)
PH UR: 8.5 (ref 5–8)
PLATELET # BLD AUTO: 237 K/UL — SIGNIFICANT CHANGE UP (ref 150–400)
POTASSIUM SERPL-MCNC: 3.9 MMOL/L — SIGNIFICANT CHANGE UP (ref 3.5–5.3)
POTASSIUM SERPL-SCNC: 3.9 MMOL/L — SIGNIFICANT CHANGE UP (ref 3.5–5.3)
PROT SERPL-MCNC: 7.5 G/DL — SIGNIFICANT CHANGE UP (ref 6–8.3)
PROT UR-MCNC: SIGNIFICANT CHANGE UP MG/DL
RBC # BLD: 4.64 M/UL — SIGNIFICANT CHANGE UP (ref 3.8–5.2)
RBC # FLD: 13.5 % — SIGNIFICANT CHANGE UP (ref 10.3–14.5)
SODIUM SERPL-SCNC: 141 MMOL/L — SIGNIFICANT CHANGE UP (ref 135–145)
SP GR SPEC: 1.02 — SIGNIFICANT CHANGE UP (ref 1–1.03)
UROBILINOGEN FLD QL: 0.2 MG/DL — SIGNIFICANT CHANGE UP (ref 0.2–1)
WBC # BLD: 8.4 K/UL — SIGNIFICANT CHANGE UP (ref 3.8–10.5)
WBC # FLD AUTO: 8.4 K/UL — SIGNIFICANT CHANGE UP (ref 3.8–10.5)

## 2025-01-26 PROCEDURE — 99223 1ST HOSP IP/OBS HIGH 75: CPT

## 2025-01-26 PROCEDURE — 99285 EMERGENCY DEPT VISIT HI MDM: CPT

## 2025-01-26 PROCEDURE — 71045 X-RAY EXAM CHEST 1 VIEW: CPT | Mod: 26

## 2025-01-26 PROCEDURE — 74177 CT ABD & PELVIS W/CONTRAST: CPT | Mod: 26

## 2025-01-26 RX ORDER — MORPHINE SULFATE 60 MG/1
4 TABLET, FILM COATED, EXTENDED RELEASE ORAL ONCE
Refills: 0 | Status: DISCONTINUED | OUTPATIENT
Start: 2025-01-26 | End: 2025-01-26

## 2025-01-26 RX ORDER — ESOMEPRAZOLE MAGNESIUM 20 MG/1
1 CAPSULE, DELAYED RELEASE ORAL
Refills: 0 | DISCHARGE

## 2025-01-26 RX ORDER — HYDROMORPHONE HYDROCHLORIDE 4 MG/ML
0.2 INJECTION, SOLUTION INTRAMUSCULAR; INTRAVENOUS; SUBCUTANEOUS EVERY 6 HOURS
Refills: 0 | Status: DISCONTINUED | OUTPATIENT
Start: 2025-01-26 | End: 2025-01-27

## 2025-01-26 RX ORDER — CARBAMAZEPINE 200 MG
1 TABLET ORAL
Refills: 0 | DISCHARGE

## 2025-01-26 RX ORDER — HYDROMORPHONE HYDROCHLORIDE 4 MG/ML
1 INJECTION, SOLUTION INTRAMUSCULAR; INTRAVENOUS; SUBCUTANEOUS ONCE
Refills: 0 | Status: DISCONTINUED | OUTPATIENT
Start: 2025-01-26 | End: 2025-01-26

## 2025-01-26 RX ORDER — VENLAFAXINE HCL 75 MG
50 TABLET ORAL DAILY
Refills: 0 | Status: DISCONTINUED | OUTPATIENT
Start: 2025-01-27 | End: 2025-01-29

## 2025-01-26 RX ORDER — ONDANSETRON 4 MG/1
4 TABLET, ORALLY DISINTEGRATING ORAL EVERY 6 HOURS
Refills: 0 | Status: DISCONTINUED | OUTPATIENT
Start: 2025-01-26 | End: 2025-01-29

## 2025-01-26 RX ORDER — PANTOPRAZOLE 20 MG/1
40 TABLET, DELAYED RELEASE ORAL
Refills: 0 | Status: DISCONTINUED | OUTPATIENT
Start: 2025-01-26 | End: 2025-01-29

## 2025-01-26 RX ORDER — ONDANSETRON 4 MG/1
4 TABLET, ORALLY DISINTEGRATING ORAL ONCE
Refills: 0 | Status: COMPLETED | OUTPATIENT
Start: 2025-01-26 | End: 2025-01-26

## 2025-01-26 RX ORDER — CARBAMAZEPINE 200 MG
200 TABLET ORAL DAILY
Refills: 0 | Status: DISCONTINUED | OUTPATIENT
Start: 2025-01-27 | End: 2025-01-29

## 2025-01-26 RX ORDER — ENOXAPARIN SODIUM 100 MG/ML
40 INJECTION SUBCUTANEOUS EVERY 24 HOURS
Refills: 0 | Status: DISCONTINUED | OUTPATIENT
Start: 2025-01-26 | End: 2025-01-26

## 2025-01-26 RX ORDER — BACTERIOSTATIC SODIUM CHLORIDE 0.9 %
1000 VIAL (ML) INJECTION ONCE
Refills: 0 | Status: COMPLETED | OUTPATIENT
Start: 2025-01-26 | End: 2025-01-26

## 2025-01-26 RX ORDER — KETOROLAC TROMETHAMINE 10 MG
30 TABLET ORAL ONCE
Refills: 0 | Status: DISCONTINUED | OUTPATIENT
Start: 2025-01-26 | End: 2025-01-26

## 2025-01-26 RX ORDER — SODIUM CHLORIDE 9 G/ML
1000 INJECTION, SOLUTION INTRAVENOUS
Refills: 0 | Status: DISCONTINUED | OUTPATIENT
Start: 2025-01-26 | End: 2025-01-29

## 2025-01-26 RX ORDER — ENOXAPARIN SODIUM 100 MG/ML
30 INJECTION SUBCUTANEOUS EVERY 24 HOURS
Refills: 0 | Status: DISCONTINUED | OUTPATIENT
Start: 2025-01-26 | End: 2025-01-29

## 2025-01-26 RX ORDER — HYDROMORPHONE HYDROCHLORIDE 4 MG/ML
0.5 INJECTION, SOLUTION INTRAMUSCULAR; INTRAVENOUS; SUBCUTANEOUS EVERY 6 HOURS
Refills: 0 | Status: DISCONTINUED | OUTPATIENT
Start: 2025-01-26 | End: 2025-01-27

## 2025-01-26 RX ORDER — ACETAMINOPHEN, DIPHENHYDRAMINE HCL, PHENYLEPHRINE HCL 325; 25; 5 MG/1; MG/1; MG/1
5 TABLET ORAL AT BEDTIME
Refills: 0 | Status: DISCONTINUED | OUTPATIENT
Start: 2025-01-26 | End: 2025-01-29

## 2025-01-26 RX ORDER — ALPRAZOLAM 2 MG
1 TABLET ORAL
Refills: 0 | DISCHARGE

## 2025-01-26 RX ORDER — HYDROMORPHONE HYDROCHLORIDE 4 MG/ML
0.5 INJECTION, SOLUTION INTRAMUSCULAR; INTRAVENOUS; SUBCUTANEOUS ONCE
Refills: 0 | Status: DISCONTINUED | OUTPATIENT
Start: 2025-01-26 | End: 2025-01-26

## 2025-01-26 RX ORDER — IOHEXOL 350 MG/ML
30 INJECTION, SOLUTION INTRAVENOUS ONCE
Refills: 0 | Status: COMPLETED | OUTPATIENT
Start: 2025-01-26 | End: 2025-01-26

## 2025-01-26 RX ADMIN — ONDANSETRON 4 MILLIGRAM(S): 4 TABLET, ORALLY DISINTEGRATING ORAL at 22:36

## 2025-01-26 RX ADMIN — HYDROMORPHONE HYDROCHLORIDE 0.5 MILLIGRAM(S): 4 INJECTION, SOLUTION INTRAMUSCULAR; INTRAVENOUS; SUBCUTANEOUS at 22:48

## 2025-01-26 RX ADMIN — IOHEXOL 30 MILLILITER(S): 350 INJECTION, SOLUTION INTRAVENOUS at 19:24

## 2025-01-26 RX ADMIN — Medication 1000 MILLILITER(S): at 22:49

## 2025-01-26 RX ADMIN — MORPHINE SULFATE 4 MILLIGRAM(S): 60 TABLET, FILM COATED, EXTENDED RELEASE ORAL at 20:33

## 2025-01-26 RX ADMIN — Medication 30 MILLIGRAM(S): at 22:36

## 2025-01-26 NOTE — H&P ADULT - NSHPPHYSICALEXAM_GEN_ALL_CORE
T(C): 36.2 (01-26-25 @ 18:52), Max: 36.2 (01-26-25 @ 18:52)  HR: 85 (01-26-25 @ 18:52) (85 - 85)  BP: 120/84 (01-26-25 @ 18:52) (120/84 - 120/84)  RR: 18 (01-26-25 @ 18:52) (18 - 18)  SpO2: 98% (01-26-25 @ 18:52) (98% - 98%)    Physical Exam  General: AAOx3, NAD, laying comfortably in bed.   Skin: No jaundice  Cardio: S1,S2  Pulm: Nonlabored breathing  Abdomen: Soft, TTP diffusely Upper>Lower, ND.   Extremities: WWP, peripheral pulses appreciated

## 2025-01-26 NOTE — ED ADULT NURSE NOTE - OBJECTIVE STATEMENT
Pt has RUQ pain since 5pm today. pt had similar pain last wk, had a CT and Hida scan that showed no gallstones. Pt has nausea, no vomiting or diarrhea. Denies fever. Pt appears uncomfortable. Pt describes abd pain as throbbing.

## 2025-01-26 NOTE — ED PROVIDER NOTE - PHYSICAL EXAMINATION
General:  Uncomfortable, no resp distress  HEENT:  No conjunctival injection, neck supple, no congestion, anicteric   Chest:  Non-tender, no crepitance  Lungs:  Clear to auscultation bilaterally   Heart:  s1s2 normal, no murmur  Abdomen:  soft, moderate RUQ tenderness  :  Deferred  Rectal:  Deferred  Extremities: full rom

## 2025-01-26 NOTE — ED PROVIDER NOTE - PRINCIPAL DIAGNOSIS
Render Post-Care Instructions In Note?: no
Number Of Freeze-Thaw Cycles: 1 freeze-thaw cycle
Detail Level: Detailed
Duration Of Freeze Thaw-Cycle (Seconds): 10
Consent: The patient's consent was obtained including but not limited to risks of crusting, scabbing, blistering, scarring, darker or lighter pigmentary change, recurrence, incomplete removal and infection.
Post-Care Instructions: I reviewed with the patient in detail post-care instructions. Patient is to wear sunprotection, and avoid picking at any of the treated lesions. Pt may apply Vaseline to crusted or scabbing areas.
Abdominal pain

## 2025-01-26 NOTE — ED ADULT TRIAGE NOTE - CHIEF COMPLAINT QUOTE
Pt to ED c/o generalized abd pain x1 week. Associated with nausea. Greg chest pain, vomiting fever, chills, nor sob.

## 2025-01-26 NOTE — H&P ADULT - ASSESSMENT
54 y. o female with PMH of Anxiety ,depression hypothyroidism , Insulin Resistance syndrome ( as per patient ), Breast cancer s/p mastectomy and reconstruction presenting with abdominal pain. Patient has had episodic abdominal pain for last 2 months with 4 episodes. She believes the episodes are increasing in frequency with 2 in the last week(one last weekend and one today). Her pain started around 5 pm and is centered in the upper abdomen with radiation to the back like it always has. She had moderate relief with tylenol although her pain has rebounded while she has been in the ED.She is nauseous but has not vomited. In the ED, pt afebrile, nontachycardic, normotensive, and satting on RA. Labs significant for Transamnitis with alk phos 154, , ALT 56 although Tbili wnl(.3). CTAP with no abd pathology, possible cystitis. Admitted to surgery for further work-up    Plan:  -NPO except meds/IVF  -Pain/Nausea PRN  - HIDA w/CCK  - MRCP  - IGG4, Hepatitis panel, Anti-Smooth Muscle AB,  GGT  - Lovenox  - AM Labs

## 2025-01-26 NOTE — ED ADULT TRIAGE NOTE - WEIGHT IN KG
Digital Medicine: Health  Follow-Up    The history is provided by the patient.   Follow Up  Follow-up reason(s): reading review and goal follow-up    Called to follow up with patient.     BP and BG readings are all within goal. DBP creeps up past 80 just a little occasionally.     Patient denies any questions or concerns.       INTERVENTION(S)  encouragement/support and denied questions    PLAN  patient verbalizes understanding, Clinician follow-up and continue monitoring    Plan to follow up in 4 weeks, sooner if BP starts to trend upwards          Topic    Eye Exam        Last 5 Patient Entered Readings                                      Current 30 Day Average: 123/76     Recent Readings 6/16/2020 6/14/2020 6/11/2020 6/9/2020 6/8/2020    SBP (mmHg) 120 124 115 107 137    DBP (mmHg) 84 75 67 71 79    Pulse 62 58 66 69 56        Last 6 Patient Entered Readings                                          Most Recent A1c: 6% on 6/9/2020  (Goal: 8%)     Recent Readings 6/14/2020 6/11/2020 6/9/2020 6/8/2020 6/6/2020    Blood Glucose (mg/dL) 141 121 128 123 102                    Diet Screening   No change to diet.      Physical Activity Screening   No change to exercise routine.          SDOH   46.7

## 2025-01-26 NOTE — ED PROVIDER NOTE - CLINICAL SUMMARY MEDICAL DECISION MAKING FREE TEXT BOX
53 yo F with RUQ abd pain sent for surgical team eval.  Team saw her shortly after arrival and requested CT and other tests as ordered. Patient given pain meds and team ultimately decided to admit for further work up.

## 2025-01-26 NOTE — H&P ADULT - HISTORY OF PRESENT ILLNESS
54 y. o female with PMH of Anxiety ,depression hypothyroidism , Insulin Resistance syndrome ( as per patient ), Breast cancer s/p mastectomy and reconstruction presenting with abdominal pain. Patient has had episodic abdominal pain for last 2 months with 4 episodes. She believes the episodes are increasing in frequency with 2 in the last week(one last weekend and one today). Her pain started around 5 pm and is centered in the upper abdomen with radiation to the back like it always has. She had moderate relief with tylenol although her pain has rebounded while she has been in the ED.She is nauseous but has not vomited. Her pain has no temporal relation to food. She underwent w/u initially at [facility] where she was daignosed with colitis on CT and treated with antibiotics. She had persistent symptoms last weekend so she underwent another CTAP on 1/18 which revealed a distended gallbladder with wall thickening concerning for acute monica although HIDA was negative. She recently saw Dr. Qureshi in clinic last tuesday and was told to follow up in ED if she had another episode.   In the ED, pt afebrile, nontachycardic, normotensive, and satting on RA. Labs significant for Transamnitis with alk phos 154, , ALT 56 although Tbili wnl(.3). CTAP with no abd pathology, possible cystitis. Surgery consulted for evaluation    Last colonoscopy/EGD -Both on 08/2023, normal findings.   Denies family hx of IBS, Crohn's, UC, or colon cancer.      PMH:PMH of Anxiety ,depression hypothyroidism , Insulin Resistance syndrome ( as per patient ) , Breast cancer s/p mastectomy and reconstruction   PSx: Mastectomy+Reconstruction  Social Hx: None  Family Hx: None  Meds: Ambien, [place] thyroid, Mounjaro, nexium, pristiq, Tegretol, Xanax,

## 2025-01-26 NOTE — ED PROVIDER NOTE - OBJECTIVE STATEMENT
53 yo F  PMH of Breast cancer, hypothyroidism presenting for evaluation by the surgical team.  She has been having episodic upper abdominal pain over the past few months and no clear etiology has been identified.  Pain is present now, mostly RUQ.

## 2025-01-26 NOTE — H&P ADULT - NSHPLABSRESULTS_GEN_ALL_CORE
LABS:  cret                        13.2   8.40  )-----------( 237      ( 26 Jan 2025 19:15 )             43.8     01-26    141  |  101  |  14  ----------------------------<  107[H]  3.9   |  32[H]  |  0.70    Ca    9.8      26 Jan 2025 19:15    TPro  7.5  /  Alb  4.5  /  TBili  0.3  /  DBili  x   /  AST  140[H]  /  ALT  56[H]  /  AlkPhos  154[H]  01-26

## 2025-01-27 LAB
ADD ON TEST-SPECIMEN IN LAB: SIGNIFICANT CHANGE UP
ANION GAP SERPL CALC-SCNC: 7 MMOL/L — SIGNIFICANT CHANGE UP (ref 5–17)
APTT BLD: 42.2 SEC — HIGH (ref 24.5–35.6)
BLD GP AB SCN SERPL QL: NEGATIVE — SIGNIFICANT CHANGE UP
BUN SERPL-MCNC: 10 MG/DL — SIGNIFICANT CHANGE UP (ref 7–23)
CALCIUM SERPL-MCNC: 8.5 MG/DL — SIGNIFICANT CHANGE UP (ref 8.4–10.5)
CHLORIDE SERPL-SCNC: 105 MMOL/L — SIGNIFICANT CHANGE UP (ref 96–108)
CO2 SERPL-SCNC: 28 MMOL/L — SIGNIFICANT CHANGE UP (ref 22–31)
CREAT SERPL-MCNC: 0.6 MG/DL — SIGNIFICANT CHANGE UP (ref 0.5–1.3)
CULTURE RESULTS: SIGNIFICANT CHANGE UP
EGFR: 107 ML/MIN/1.73M2 — SIGNIFICANT CHANGE UP
GGT SERPL-CCNC: 219 U/L — HIGH (ref 8–40)
GLUCOSE SERPL-MCNC: 88 MG/DL — SIGNIFICANT CHANGE UP (ref 70–99)
HAV IGM SER-ACNC: SIGNIFICANT CHANGE UP
HBV CORE AB SER-ACNC: SIGNIFICANT CHANGE UP
HBV CORE IGM SER-ACNC: SIGNIFICANT CHANGE UP
HBV SURFACE AB SER-ACNC: ABNORMAL
HBV SURFACE AG SER-ACNC: SIGNIFICANT CHANGE UP
HCT VFR BLD CALC: 35 % — SIGNIFICANT CHANGE UP (ref 34.5–45)
HCV AB S/CO SERPL IA: 0.05 S/CO — SIGNIFICANT CHANGE UP
HCV AB SERPL-IMP: SIGNIFICANT CHANGE UP
HGB BLD-MCNC: 10.6 G/DL — LOW (ref 11.5–15.5)
IGG FLD-MCNC: 887 MG/DL — SIGNIFICANT CHANGE UP (ref 610–1660)
IGG1 SER-MCNC: 361 MG/DL — SIGNIFICANT CHANGE UP (ref 240–1118)
IGG2 SER-MCNC: 488 MG/DL — SIGNIFICANT CHANGE UP (ref 124–549)
IGG3 SER-MCNC: 14 MG/DL — LOW (ref 15–102)
IGG4 SER-MCNC: 12.8 MG/DL — SIGNIFICANT CHANGE UP (ref 1–123)
INR BLD: 0.99 — SIGNIFICANT CHANGE UP (ref 0.85–1.16)
MAGNESIUM SERPL-MCNC: 2 MG/DL — SIGNIFICANT CHANGE UP (ref 1.6–2.6)
MCHC RBC-ENTMCNC: 28.7 PG — SIGNIFICANT CHANGE UP (ref 27–34)
MCHC RBC-ENTMCNC: 30.3 G/DL — LOW (ref 32–36)
MCV RBC AUTO: 94.9 FL — SIGNIFICANT CHANGE UP (ref 80–100)
NRBC # BLD: 0 /100 WBCS — SIGNIFICANT CHANGE UP (ref 0–0)
NRBC BLD-RTO: 0 /100 WBCS — SIGNIFICANT CHANGE UP (ref 0–0)
PHOSPHATE SERPL-MCNC: 3 MG/DL — SIGNIFICANT CHANGE UP (ref 2.5–4.5)
PLATELET # BLD AUTO: 182 K/UL — SIGNIFICANT CHANGE UP (ref 150–400)
POTASSIUM SERPL-MCNC: 4.2 MMOL/L — SIGNIFICANT CHANGE UP (ref 3.5–5.3)
POTASSIUM SERPL-SCNC: 4.2 MMOL/L — SIGNIFICANT CHANGE UP (ref 3.5–5.3)
PROTHROM AB SERPL-ACNC: 11.4 SEC — SIGNIFICANT CHANGE UP (ref 9.9–13.4)
RBC # BLD: 3.69 M/UL — LOW (ref 3.8–5.2)
RBC # FLD: 13.4 % — SIGNIFICANT CHANGE UP (ref 10.3–14.5)
RH IG SCN BLD-IMP: POSITIVE — SIGNIFICANT CHANGE UP
SODIUM SERPL-SCNC: 140 MMOL/L — SIGNIFICANT CHANGE UP (ref 135–145)
SPECIMEN SOURCE: SIGNIFICANT CHANGE UP
T4 AB SER-ACNC: 3.65 UG/DL — LOW (ref 4.5–11.7)
TSH SERPL-MCNC: 7.46 UIU/ML — HIGH (ref 0.27–4.2)
WBC # BLD: 5.62 K/UL — SIGNIFICANT CHANGE UP (ref 3.8–10.5)
WBC # FLD AUTO: 5.62 K/UL — SIGNIFICANT CHANGE UP (ref 3.8–10.5)

## 2025-01-27 PROCEDURE — 99231 SBSQ HOSP IP/OBS SF/LOW 25: CPT

## 2025-01-27 PROCEDURE — 93010 ELECTROCARDIOGRAM REPORT: CPT

## 2025-01-27 PROCEDURE — 78227 HEPATOBIL SYST IMAGE W/DRUG: CPT | Mod: 26

## 2025-01-27 RX ORDER — HYDROMORPHONE HYDROCHLORIDE 4 MG/ML
0.5 INJECTION, SOLUTION INTRAMUSCULAR; INTRAVENOUS; SUBCUTANEOUS EVERY 4 HOURS
Refills: 0 | Status: DISCONTINUED | OUTPATIENT
Start: 2025-01-27 | End: 2025-01-29

## 2025-01-27 RX ORDER — ACETAMINOPHEN 160 MG/5ML
700 SUSPENSION ORAL ONCE
Refills: 0 | Status: COMPLETED | OUTPATIENT
Start: 2025-01-27 | End: 2025-01-27

## 2025-01-27 RX ORDER — OXYCODONE HYDROCHLORIDE 30 MG/1
2.5 TABLET ORAL ONCE
Refills: 0 | Status: DISCONTINUED | OUTPATIENT
Start: 2025-01-27 | End: 2025-01-27

## 2025-01-27 RX ORDER — KETOROLAC TROMETHAMINE 10 MG
15 TABLET ORAL ONCE
Refills: 0 | Status: DISCONTINUED | OUTPATIENT
Start: 2025-01-27 | End: 2025-01-27

## 2025-01-27 RX ORDER — ALPRAZOLAM 2 MG
0.5 TABLET ORAL AT BEDTIME
Refills: 0 | Status: DISCONTINUED | OUTPATIENT
Start: 2025-01-27 | End: 2025-01-29

## 2025-01-27 RX ORDER — CEFTRIAXONE 250 MG/1
1000 INJECTION, POWDER, FOR SOLUTION INTRAMUSCULAR; INTRAVENOUS EVERY 24 HOURS
Refills: 0 | Status: COMPLETED | OUTPATIENT
Start: 2025-01-27 | End: 2025-01-29

## 2025-01-27 RX ORDER — HYDROMORPHONE HYDROCHLORIDE 4 MG/ML
0.2 INJECTION, SOLUTION INTRAMUSCULAR; INTRAVENOUS; SUBCUTANEOUS EVERY 4 HOURS
Refills: 0 | Status: DISCONTINUED | OUTPATIENT
Start: 2025-01-27 | End: 2025-01-29

## 2025-01-27 RX ORDER — ACETAMINOPHEN 160 MG/5ML
650 SUSPENSION ORAL EVERY 6 HOURS
Refills: 0 | Status: DISCONTINUED | OUTPATIENT
Start: 2025-01-27 | End: 2025-01-29

## 2025-01-27 RX ADMIN — CEFTRIAXONE 100 MILLIGRAM(S): 250 INJECTION, POWDER, FOR SOLUTION INTRAMUSCULAR; INTRAVENOUS at 03:33

## 2025-01-27 RX ADMIN — ACETAMINOPHEN 280 MILLIGRAM(S): 160 SUSPENSION ORAL at 16:39

## 2025-01-27 RX ADMIN — SODIUM CHLORIDE 85 MILLILITER(S): 9 INJECTION, SOLUTION INTRAVENOUS at 01:11

## 2025-01-27 RX ADMIN — ACETAMINOPHEN 280 MILLIGRAM(S): 160 SUSPENSION ORAL at 02:14

## 2025-01-27 RX ADMIN — ACETAMINOPHEN 700 MILLIGRAM(S): 160 SUSPENSION ORAL at 03:14

## 2025-01-27 RX ADMIN — ENOXAPARIN SODIUM 30 MILLIGRAM(S): 100 INJECTION SUBCUTANEOUS at 01:11

## 2025-01-27 RX ADMIN — ENOXAPARIN SODIUM 30 MILLIGRAM(S): 100 INJECTION SUBCUTANEOUS at 21:39

## 2025-01-27 RX ADMIN — HYDROMORPHONE HYDROCHLORIDE 0.5 MILLIGRAM(S): 4 INJECTION, SOLUTION INTRAMUSCULAR; INTRAVENOUS; SUBCUTANEOUS at 22:32

## 2025-01-27 RX ADMIN — ONDANSETRON 4 MILLIGRAM(S): 4 TABLET, ORALLY DISINTEGRATING ORAL at 02:14

## 2025-01-27 RX ADMIN — HYDROMORPHONE HYDROCHLORIDE 0.5 MILLIGRAM(S): 4 INJECTION, SOLUTION INTRAMUSCULAR; INTRAVENOUS; SUBCUTANEOUS at 16:39

## 2025-01-27 RX ADMIN — HYDROMORPHONE HYDROCHLORIDE 0.5 MILLIGRAM(S): 4 INJECTION, SOLUTION INTRAMUSCULAR; INTRAVENOUS; SUBCUTANEOUS at 04:38

## 2025-01-27 RX ADMIN — ACETAMINOPHEN 700 MILLIGRAM(S): 160 SUSPENSION ORAL at 17:06

## 2025-01-27 RX ADMIN — SODIUM CHLORIDE 85 MILLILITER(S): 9 INJECTION, SOLUTION INTRAVENOUS at 13:38

## 2025-01-27 RX ADMIN — HYDROMORPHONE HYDROCHLORIDE 0.5 MILLIGRAM(S): 4 INJECTION, SOLUTION INTRAMUSCULAR; INTRAVENOUS; SUBCUTANEOUS at 17:06

## 2025-01-27 RX ADMIN — HYDROMORPHONE HYDROCHLORIDE 0.5 MILLIGRAM(S): 4 INJECTION, SOLUTION INTRAMUSCULAR; INTRAVENOUS; SUBCUTANEOUS at 05:08

## 2025-01-27 RX ADMIN — HYDROMORPHONE HYDROCHLORIDE 0.5 MILLIGRAM(S): 4 INJECTION, SOLUTION INTRAMUSCULAR; INTRAVENOUS; SUBCUTANEOUS at 10:14

## 2025-01-27 RX ADMIN — Medication 200 MILLIGRAM(S): at 21:29

## 2025-01-27 RX ADMIN — Medication 15 MILLIGRAM(S): at 16:06

## 2025-01-27 RX ADMIN — Medication 15 MILLIGRAM(S): at 16:39

## 2025-01-27 RX ADMIN — HYDROMORPHONE HYDROCHLORIDE 0.5 MILLIGRAM(S): 4 INJECTION, SOLUTION INTRAMUSCULAR; INTRAVENOUS; SUBCUTANEOUS at 21:32

## 2025-01-27 RX ADMIN — ACETAMINOPHEN 650 MILLIGRAM(S): 160 SUSPENSION ORAL at 21:31

## 2025-01-27 NOTE — PATIENT PROFILE ADULT - DOES PATIENT HAVE ADVANCE DIRECTIVE
Detail Level: Detailed Quality 226: Preventive Care And Screening: Tobacco Use: Screening And Cessation Intervention: Patient screened for tobacco use and is an ex/non-smoker Quality 47: Advance Care Plan: Advance Care Planning discussed and documented; advance care plan or surrogate decision maker documented in the medical record. No

## 2025-01-27 NOTE — PROGRESS NOTE ADULT - SUBJECTIVE AND OBJECTIVE BOX
INTERVAL HPI/OVERNIGHT EVENTS: admitted to General Surgery , upper abd pain unk origin.    SUBJECTIVE: Patient seen and examined at bedside by chief resident. Patient reports pain is controlled with medication. Patient reports some nausea but denies vomiting. Denies fever, CP, SOB.    MEDICATIONS  (STANDING):  carBAMazepine 200 milliGRAM(s) Oral daily  cefTRIAXone   IVPB 1000 milliGRAM(s) IV Intermittent every 24 hours  enoxaparin Injectable 30 milliGRAM(s) SubCutaneous every 24 hours  lactated ringers. 1000 milliLiter(s) (85 mL/Hr) IV Continuous <Continuous>  melatonin 5 milliGRAM(s) Oral at bedtime  pantoprazole    Tablet 40 milliGRAM(s) Oral before breakfast  venlafaxine 50 milliGRAM(s) Oral daily    MEDICATIONS  (PRN):  HYDROmorphone  Injectable 0.2 milliGRAM(s) IV Push every 6 hours PRN Moderate Pain (4 - 6)  HYDROmorphone  Injectable 0.5 milliGRAM(s) IV Push every 6 hours PRN Severe Pain (7 - 10)  ondansetron Injectable 4 milliGRAM(s) IV Push every 6 hours PRN Nausea      Vital Signs Last 24 Hrs  T(C): 36.4 (27 Jan 2025 08:50), Max: 36.8 (26 Jan 2025 20:35)  T(F): 97.5 (27 Jan 2025 08:50), Max: 98.2 (26 Jan 2025 20:35)  HR: 71 (27 Jan 2025 08:50) (71 - 85)  BP: 95/64 (27 Jan 2025 08:50) (80/48 - 120/84)  BP(mean): --  RR: 18 (27 Jan 2025 08:50) (18 - 76)  SpO2: 98% (27 Jan 2025 08:50) (96% - 100%)    Parameters below as of 27 Jan 2025 08:50  Patient On (Oxygen Delivery Method): room air        PHYSICAL EXAM:    General: NAD, resting comfortably in bed    Respiratory: non labored breathing, no respiratory distress    Cardiovascular: NSR, RRR    Gastrointestinal: soft, ND, mild TTP. no rebound or guarding.     Extremities: (-) edema, SCDs in place                  I&O's Detail    26 Jan 2025 07:01  -  27 Jan 2025 07:00  --------------------------------------------------------  IN:    IV PiggyBack: 100 mL    IV PiggyBack: 100 mL    Lactated Ringers: 615.5 mL  Total IN: 815.5 mL    OUT:    Voided (mL): 1 mL  Total OUT: 1 mL    Total NET: 814.5 mL      27 Jan 2025 07:01  -  27 Jan 2025 11:08  --------------------------------------------------------  IN:    Lactated Ringers: 85 mL  Total IN: 85 mL    OUT:  Total OUT: 0 mL    Total NET: 85 mL          LABS:                        10.6   5.62  )-----------( 182      ( 27 Jan 2025 05:30 )             35.0     01-27    140  |  105  |  10  ----------------------------<  88  4.2   |  28  |  0.60    Ca    8.5      27 Jan 2025 05:30  Phos  3.0     01-27  Mg     2.0     01-27    TPro  7.5  /  Alb  4.5  /  TBili  0.3  /  DBili  x   /  AST  140[H]  /  ALT  56[H]  /  AlkPhos  154[H]  01-26    PT/INR - ( 27 Jan 2025 05:30 )   PT: 11.4 sec;   INR: 0.99          PTT - ( 27 Jan 2025 05:30 )  PTT:42.2 sec  Urinalysis Basic - ( 27 Jan 2025 05:30 )    Color: x / Appearance: x / SG: x / pH: x  Gluc: 88 mg/dL / Ketone: x  / Bili: x / Urobili: x   Blood: x / Protein: x / Nitrite: x   Leuk Esterase: x / RBC: x / WBC x   Sq Epi: x / Non Sq Epi: x / Bacteria: x        RADIOLOGY & ADDITIONAL STUDIES:

## 2025-01-27 NOTE — PATIENT PROFILE ADULT - PATIENT'S GENDER IDENTITY
46 yo female reported to ED complaining of fever since this morning. Pt is A&Ox3. Pt had a cytoscopy and uteroscopy with laser lithotripsy and stent placement yesterday. Pt stated that she did not take anything for the fever. Pt has had painful urination but did not see any blood. Pt has also had bilateral lower flank pain. She has also had chills, cough, and dizziness. Pt describes it as "lightheadedness". Pt is able to ambulate with a steady gait. Will continue to monitor. Female

## 2025-01-27 NOTE — PATIENT PROFILE ADULT - FALL HARM RISK - UNIVERSAL INTERVENTIONS
Bed in lowest position, wheels locked, appropriate side rails in place/Call bell, personal items and telephone in reach/Instruct patient to call for assistance before getting out of bed or chair/Non-slip footwear when patient is out of bed/[place] to call system/Physically safe environment - no spills, clutter or unnecessary equipment/Purposeful Proactive Rounding/Room/bathroom lighting operational, light cord in reach

## 2025-01-28 ENCOUNTER — TRANSCRIPTION ENCOUNTER (OUTPATIENT)
Age: 55
End: 2025-01-28

## 2025-01-28 LAB
ALBUMIN SERPL ELPH-MCNC: 3.6 G/DL — SIGNIFICANT CHANGE UP (ref 3.3–5)
ALP SERPL-CCNC: 105 U/L — SIGNIFICANT CHANGE UP (ref 40–120)
ALT FLD-CCNC: 52 U/L — HIGH (ref 10–45)
ANION GAP SERPL CALC-SCNC: 7 MMOL/L — SIGNIFICANT CHANGE UP (ref 5–17)
AST SERPL-CCNC: 29 U/L — SIGNIFICANT CHANGE UP (ref 10–40)
BILIRUB SERPL-MCNC: 0.2 MG/DL — SIGNIFICANT CHANGE UP (ref 0.2–1.2)
BUN SERPL-MCNC: 10 MG/DL — SIGNIFICANT CHANGE UP (ref 7–23)
CALCIUM SERPL-MCNC: 8.8 MG/DL — SIGNIFICANT CHANGE UP (ref 8.4–10.5)
CHLORIDE SERPL-SCNC: 103 MMOL/L — SIGNIFICANT CHANGE UP (ref 96–108)
CO2 SERPL-SCNC: 30 MMOL/L — SIGNIFICANT CHANGE UP (ref 22–31)
CREAT SERPL-MCNC: 0.61 MG/DL — SIGNIFICANT CHANGE UP (ref 0.5–1.3)
EGFR: 107 ML/MIN/1.73M2 — SIGNIFICANT CHANGE UP
GLUCOSE SERPL-MCNC: 82 MG/DL — SIGNIFICANT CHANGE UP (ref 70–99)
HCT VFR BLD CALC: 35.9 % — SIGNIFICANT CHANGE UP (ref 34.5–45)
HGB BLD-MCNC: 11 G/DL — LOW (ref 11.5–15.5)
MAGNESIUM SERPL-MCNC: 1.8 MG/DL — SIGNIFICANT CHANGE UP (ref 1.6–2.6)
MCHC RBC-ENTMCNC: 29 PG — SIGNIFICANT CHANGE UP (ref 27–34)
MCHC RBC-ENTMCNC: 30.6 G/DL — LOW (ref 32–36)
MCV RBC AUTO: 94.7 FL — SIGNIFICANT CHANGE UP (ref 80–100)
NRBC # BLD: 0 /100 WBCS — SIGNIFICANT CHANGE UP (ref 0–0)
NRBC BLD-RTO: 0 /100 WBCS — SIGNIFICANT CHANGE UP (ref 0–0)
PHOSPHATE SERPL-MCNC: 3 MG/DL — SIGNIFICANT CHANGE UP (ref 2.5–4.5)
PLATELET # BLD AUTO: 172 K/UL — SIGNIFICANT CHANGE UP (ref 150–400)
POTASSIUM SERPL-MCNC: 3.7 MMOL/L — SIGNIFICANT CHANGE UP (ref 3.5–5.3)
POTASSIUM SERPL-SCNC: 3.7 MMOL/L — SIGNIFICANT CHANGE UP (ref 3.5–5.3)
PROT SERPL-MCNC: 6 G/DL — SIGNIFICANT CHANGE UP (ref 6–8.3)
RBC # BLD: 3.79 M/UL — LOW (ref 3.8–5.2)
RBC # FLD: 13.3 % — SIGNIFICANT CHANGE UP (ref 10.3–14.5)
SMOOTH MUSCLE AB SER-ACNC: SIGNIFICANT CHANGE UP
SODIUM SERPL-SCNC: 140 MMOL/L — SIGNIFICANT CHANGE UP (ref 135–145)
WBC # BLD: 3.98 K/UL — SIGNIFICANT CHANGE UP (ref 3.8–10.5)
WBC # FLD AUTO: 3.98 K/UL — SIGNIFICANT CHANGE UP (ref 3.8–10.5)

## 2025-01-28 PROCEDURE — 99223 1ST HOSP IP/OBS HIGH 75: CPT

## 2025-01-28 PROCEDURE — 99232 SBSQ HOSP IP/OBS MODERATE 35: CPT | Mod: 57

## 2025-01-28 PROCEDURE — 74183 MRI ABD W/O CNTR FLWD CNTR: CPT | Mod: 26

## 2025-01-28 RX ORDER — MAGNESIUM SULFATE 0.8 MEQ/ML
1 AMPUL (ML) INJECTION ONCE
Refills: 0 | Status: COMPLETED | OUTPATIENT
Start: 2025-01-28 | End: 2025-01-28

## 2025-01-28 RX ORDER — POTASSIUM CHLORIDE 750 MG/1
20 TABLET, EXTENDED RELEASE ORAL ONCE
Refills: 0 | Status: COMPLETED | OUTPATIENT
Start: 2025-01-28 | End: 2025-01-28

## 2025-01-28 RX ORDER — POLYETHYLENE GLYCOL 3350 17 G/17G
17 POWDER, FOR SOLUTION ORAL ONCE
Refills: 0 | Status: COMPLETED | OUTPATIENT
Start: 2025-01-28 | End: 2025-01-28

## 2025-01-28 RX ADMIN — ACETAMINOPHEN 650 MILLIGRAM(S): 160 SUSPENSION ORAL at 10:19

## 2025-01-28 RX ADMIN — ACETAMINOPHEN 650 MILLIGRAM(S): 160 SUSPENSION ORAL at 16:17

## 2025-01-28 RX ADMIN — SODIUM CHLORIDE 85 MILLILITER(S): 9 INJECTION, SOLUTION INTRAVENOUS at 13:28

## 2025-01-28 RX ADMIN — ACETAMINOPHEN 650 MILLIGRAM(S): 160 SUSPENSION ORAL at 23:40

## 2025-01-28 RX ADMIN — POLYETHYLENE GLYCOL 3350 17 GRAM(S): 17 POWDER, FOR SOLUTION ORAL at 07:33

## 2025-01-28 RX ADMIN — ENOXAPARIN SODIUM 30 MILLIGRAM(S): 100 INJECTION SUBCUTANEOUS at 23:00

## 2025-01-28 RX ADMIN — HYDROMORPHONE HYDROCHLORIDE 0.5 MILLIGRAM(S): 4 INJECTION, SOLUTION INTRAMUSCULAR; INTRAVENOUS; SUBCUTANEOUS at 04:23

## 2025-01-28 RX ADMIN — HYDROMORPHONE HYDROCHLORIDE 0.5 MILLIGRAM(S): 4 INJECTION, SOLUTION INTRAMUSCULAR; INTRAVENOUS; SUBCUTANEOUS at 05:23

## 2025-01-28 RX ADMIN — Medication 200 MILLIGRAM(S): at 23:00

## 2025-01-28 RX ADMIN — ACETAMINOPHEN, DIPHENHYDRAMINE HCL, PHENYLEPHRINE HCL 5 MILLIGRAM(S): 325; 25; 5 TABLET ORAL at 23:00

## 2025-01-28 RX ADMIN — HYDROMORPHONE HYDROCHLORIDE 0.5 MILLIGRAM(S): 4 INJECTION, SOLUTION INTRAMUSCULAR; INTRAVENOUS; SUBCUTANEOUS at 09:45

## 2025-01-28 RX ADMIN — PANTOPRAZOLE 40 MILLIGRAM(S): 20 TABLET, DELAYED RELEASE ORAL at 07:13

## 2025-01-28 RX ADMIN — POTASSIUM CHLORIDE 20 MILLIEQUIVALENT(S): 750 TABLET, EXTENDED RELEASE ORAL at 10:20

## 2025-01-28 RX ADMIN — CEFTRIAXONE 100 MILLIGRAM(S): 250 INJECTION, POWDER, FOR SOLUTION INTRAMUSCULAR; INTRAVENOUS at 04:13

## 2025-01-28 RX ADMIN — ACETAMINOPHEN 650 MILLIGRAM(S): 160 SUSPENSION ORAL at 23:00

## 2025-01-28 RX ADMIN — SODIUM CHLORIDE 85 MILLILITER(S): 9 INJECTION, SOLUTION INTRAVENOUS at 02:00

## 2025-01-28 RX ADMIN — HYDROMORPHONE HYDROCHLORIDE 0.5 MILLIGRAM(S): 4 INJECTION, SOLUTION INTRAMUSCULAR; INTRAVENOUS; SUBCUTANEOUS at 09:15

## 2025-01-28 RX ADMIN — ACETAMINOPHEN 650 MILLIGRAM(S): 160 SUSPENSION ORAL at 04:12

## 2025-01-28 RX ADMIN — Medication 100 GRAM(S): at 10:22

## 2025-01-28 NOTE — DISCHARGE NOTE PROVIDER - NSDCMRMEDTOKEN_GEN_ALL_CORE_FT
Ambien CR 12.5 mg oral tablet, extended release: 1 tab(s) orally once a day (at bedtime)  [place] Thyroid 60 mg oral tablet: 1 tab(s) orally once a day  Mounjaro 2.5 mg/0.5 mL subcutaneous solution: 2.5 milligram(s) subcutaneously once a week  NexIUM 40 mg oral delayed release capsule: 1 cap(s) orally  Pristiq 50 mg oral tablet, extended release: 1 tab(s) orally once a day (at bedtime)  Tegretol 200 mg oral tablet: 1 tab(s) orally once a day  Vitamin D3 125 mcg (5000 intl units) oral capsule: 1 cap(s) orally 3 times a week  Xanax 0.25 mg oral tablet: 1 tab(s) orally   Ambien CR 12.5 mg oral tablet, extended release: 1 tab(s) orally once a day (at bedtime)  [place] Thyroid 60 mg oral tablet: 1 tab(s) orally once a day  NexIUM 40 mg oral delayed release capsule: 1 cap(s) orally  Pristiq 50 mg oral tablet, extended release: 1 tab(s) orally once a day (at bedtime)  Tegretol 200 mg oral tablet: 1 tab(s) orally once a day  Vitamin D3 125 mcg (5000 intl units) oral capsule: 1 cap(s) orally 3 times a week  Xanax 0.25 mg oral tablet: 1 tab(s) orally

## 2025-01-28 NOTE — CONSULT NOTE ADULT - SUBJECTIVE AND OBJECTIVE BOX
GASTROENTEROLOGY CONSULT NOTE  HPI:  53 yo F, PMH of MDD/SHIRA, hypothyroidism, Insulin Resistance syndrome ( as per patient ), Breast cancer s/p mastectomy and reconstruction pw intermittent episodic abdominal pain.     Reports she experienced acute nausea, vomiting, diarrhea in October, and since had multiple episodes of sharp abd pain, severe, prompting multiple ED visits in both [place] and [place].     Reports having prior abd pain , not as severe, and had EGD performed with Dr. Purvis which were reportedly negative for PUD    Apparently another CT-AP on 1/18 revealed a distended gallbladder with wall thickening concerning for acute monica although HIDA was negative.     Reportedly last colonoscopy/EGD -Both on 08/2023, normal findings.   Denies family hx of IBS, Crohn's, UC, or colon cancer.      PMH:PMH of Anxiety ,depression hypothyroidism , Insulin Resistance syndrome ( as per patient ) , Breast cancer s/p mastectomy and reconstruction   PSx: Mastectomy+Reconstruction  Social Hx: None  Family Hx: None  Meds: Ambien, [place] thyroid, Mounjaro, nexium, pristiq, Tegretol, Xanax,     Allergies    penicillins (Unknown)    Intolerances      Home Medications:  Ambien CR 12.5 mg oral tablet, extended release: 1 tab(s) orally once a day (at bedtime) (26 Jan 2025 22:48)  [place] Thyroid 60 mg oral tablet: 1 tab(s) orally once a day (26 Jan 2025 22:48)  Mounjaro 2.5 mg/0.5 mL subcutaneous solution: 2.5 milligram(s) subcutaneously once a week (26 Jan 2025 22:47)  NexIUM 40 mg oral delayed release capsule: 1 cap(s) orally (26 Jan 2025 23:01)  Pristiq 50 mg oral tablet, extended release: 1 tab(s) orally once a day (at bedtime) (26 Jan 2025 22:48)  Tegretol 200 mg oral tablet: 1 tab(s) orally once a day (26 Jan 2025 22:48)  Vitamin D3 125 mcg (5000 intl units) oral capsule: 1 cap(s) orally 3 times a week (26 Jan 2025 22:48)  Xanax 0.25 mg oral tablet: 1 tab(s) orally (26 Jan 2025 22:57)    MEDICATIONS:  MEDICATIONS  (STANDING):  acetaminophen     Tablet .. 650 milliGRAM(s) Oral every 6 hours  carBAMazepine 200 milliGRAM(s) Oral daily  cefTRIAXone   IVPB 1000 milliGRAM(s) IV Intermittent every 24 hours  enoxaparin Injectable 30 milliGRAM(s) SubCutaneous every 24 hours  lactated ringers. 1000 milliLiter(s) (85 mL/Hr) IV Continuous <Continuous>  melatonin 5 milliGRAM(s) Oral at bedtime  pantoprazole    Tablet 40 milliGRAM(s) Oral before breakfast  venlafaxine 50 milliGRAM(s) Oral daily    MEDICATIONS  (PRN):  ALPRAZolam 0.5 milliGRAM(s) Oral at bedtime PRN Insomnia  HYDROmorphone  Injectable 0.2 milliGRAM(s) IV Push every 4 hours PRN Moderate Pain (4 - 6)  HYDROmorphone  Injectable 0.5 milliGRAM(s) IV Push every 4 hours PRN Severe Pain (7 - 10)  ondansetron Injectable 4 milliGRAM(s) IV Push every 6 hours PRN Nausea and/or Vomiting    PAST MEDICAL & SURGICAL HISTORY:  Breast cancer      Anxiety      Hypothyroidism      History of insulin resistance      Radicular pain of shoulder      Osteoporosis      History of lumpectomy      History of bilateral mastectomy      History of breast reconstruction      REVIEW OF SYSTEMS:  All other 10 review of systems is negative unless indicated above.    Vital Signs Last 24 Hrs  T(C): 36.4 (28 Jan 2025 12:31), Max: 36.9 (27 Jan 2025 21:18)  T(F): 97.5 (28 Jan 2025 12:31), Max: 98.4 (27 Jan 2025 21:18)  HR: 84 (28 Jan 2025 12:31) (70 - 84)  BP: 99/65 (28 Jan 2025 12:31) (92/60 - 102/67)  BP(mean): --  RR: 17 (28 Jan 2025 12:31) (17 - 18)  SpO2: 99% (28 Jan 2025 12:31) (98% - 99%)    Parameters below as of 28 Jan 2025 12:31  Patient On (Oxygen Delivery Method): room air        01-27 @ 07:01  -  01-28 @ 07:00  --------------------------------------------------------  IN: 1515 mL / OUT: 1050 mL / NET: 465 mL    01-28 @ 07:01  -  01-28 @ 14:32  --------------------------------------------------------  IN: 1570 mL / OUT: 0 mL / NET: 1570 mL        PHYSICAL EXAM:    General: lying in bed, in no acute distress  HEENT: Neck supple, mmm, no jvd  Lungs: Normal respiratory effort, no intercostal retractions  Cardiovascular: regular rate  Abdomen: Soft, mild epigastric tenderness, non-distended; No rebound or guarding  Extremities: wwp, no cce  Neurological: LINK, speech fluent  Skin: Warm and dry. No obvious rash    LABS:                        11.0   3.98  )-----------( 172      ( 28 Jan 2025 08:06 )             35.9     01-28    140  |  103  |  10  ----------------------------<  82  3.7   |  30  |  0.61    Ca    8.8      28 Jan 2025 08:06  Phos  3.0     01-28  Mg     1.8     01-28    TPro  6.0  /  Alb  3.6  /  TBili  0.2  /  DBili  x   /  AST  29  /  ALT  52[H]  /  AlkPhos  105  01-28        PT/INR - ( 27 Jan 2025 05:30 )   PT: 11.4 sec;   INR: 0.99          PTT - ( 27 Jan 2025 05:30 )  PTT:42.2 sec    Culture - Urine (collected 27 Jan 2025 00:18)  Source: Clean Catch Clean Catch (Midstream)  Final Report (27 Jan 2025 22:53):    <10,000 CFU/mL Normal Urogenital Digna    Urinalysis with Rflx Culture (collected 26 Jan 2025 19:15)      RADIOLOGY & ADDITIONAL STUDIES:     Reviewed

## 2025-01-28 NOTE — CONSULT NOTE ADULT - ASSESSMENT
53 yo F, PMH of MDD/SHIRA, hypothyroidism, Insulin Resistance syndrome ( as per patient ), Breast cancer s/p mastectomy and reconstruction pw intermittent episodic abdominal pain.     MR and lab work reviewed    Recommendations:  NPO at [facility] for tentative EGD/EUS tomorrow   trend liver enzymes    seen and dw Dr. Botello    Thank you for the courtesy of this consult. We will follow along with you.    Demario Cool M.D.  Gastroenterology Fellow  GI Consult Weekday 7am-5pm Pager: [phone]  Weeknights/Weekend/Holiday Coverage: Please Call the  for contact info  Follow Up Questions welcome via [facility] [employer] Teams Messenger

## 2025-01-28 NOTE — DISCHARGE NOTE PROVIDER - NSDCFUADDAPPT_GEN_ALL_CORE_FT
Please schedule a follow-up appointment with Dr. Qureshi within 1-2 weeks of discharge from the hospital. You may reach his office at [phone] at your earliest convenience.

## 2025-01-28 NOTE — DISCHARGE NOTE PROVIDER - NSDCFUADDINST_GEN_ALL_CORE_FT
Please take Tylenol 1000mg every 6 hours for pain. You may alternate every 3 hours with ibuprofen as needed.   Please take oxycodone 5mg every 6 hours as needed for pain. Take colace 100mg daily while taking oxycodone to prevent constipation.    Follow up with Dr. Qureshi in 1-2 weeks. Call the office at the number below to schedule your appointment. You may shower; soap and water over incision sites. Do not scrub. Pat dry when done. No tub bathing or swimming until cleared. Keep incision sites out of the sun as scars will darken. Ambulate as tolerated, but no heavy lifting (>10lbs) or strenuous exercise. You may resume regular diet. You should be urinating at least 3-4x per day. Call the office if you experience increasing abdominal pain, nausea, vomiting, or temperature >101 F.    General Discharge Instructions:  Please resume all regular home medications unless specifically advised not to take a particular medication. Also, please take any new medications as prescribed.  Please get plenty of rest, continue to ambulate several times per day, and drink adequate amounts of fluids. Avoid lifting weights greater than 5-10 lbs until you follow-up with your surgeon, who will instruct you further regarding activity restrictions.  Avoid driving or operating heavy machinery while taking pain medications.  Please follow-up with your surgeon and Primary Care Provider (PCP) as advised.  Incision Care:  *Please call your doctor or nurse practitioner if you have increased pain, swelling, redness, or drainage from the incision site.  *Avoid swimming and baths until your follow-up appointment.  *You may shower, and wash surgical incisions with a mild soap and warm water. Gently pat the area dry.      Warning Signs:  Please call your doctor or nurse practitioner if you experience the following:  *You experience new chest pain, pressure, squeezing or tightness.  *New or worsening cough, shortness of breath, or wheeze.  *If you are vomiting and cannot keep down fluids or your medications.  *You are getting dehydrated due to continued vomiting, diarrhea, or other reasons. Signs of dehydration include dry mouth, rapid heartbeat, or feeling dizzy or faint when standing.  *You see blood or dark/black material when you vomit or have a bowel movement.  *You experience burning when you urinate, have blood in your urine, or experience a discharge.  *Your pain is not improving within 8-12 hours or is not gone within 24 hours. Call or return immediately if your pain is getting worse, changes location, or moves to your chest or back.  *You have shaking chills, or fever greater than 101.5 degrees Fahrenheit or 38 degrees Celsius.  *Any change in your symptoms, or any new symptoms that concern you.     Please take Tylenol 1000mg every 6 hours for pain. Do not exceed 4000mg in 24 hours.     Follow up with Dr. Qureshi in 1-2 weeks. Call the office at the number below to schedule your appointment. You may shower; soap and water over incision sites. Do not scrub. Pat dry when done. No tub bathing or swimming until cleared. Keep incision sites out of the sun as scars will darken. Ambulate as tolerated, but no heavy lifting (>10lbs) or strenuous exercise. You may resume regular diet. You should be urinating at least 3-4x per day. Call the office if you experience increasing abdominal pain, nausea, vomiting, or temperature >101 F.    General Discharge Instructions:  Please resume all regular home medications unless specifically advised not to take a particular medication. Also, please take any new medications as prescribed.  Please get plenty of rest, continue to ambulate several times per day, and drink adequate amounts of fluids. Avoid lifting weights greater than 5-10 lbs until you follow-up with your surgeon, who will instruct you further regarding activity restrictions.  Avoid driving or operating heavy machinery while taking pain medications.  Please follow-up with your surgeon and Primary Care Provider (PCP) as advised.  Incision Care:  *Please call your doctor or nurse practitioner if you have increased pain, swelling, redness, or drainage from the incision site.  *Avoid swimming and baths until your follow-up appointment.  *You may shower, and wash surgical incisions with a mild soap and warm water. Gently pat the area dry.      Warning Signs:  Please call your doctor or nurse practitioner if you experience the following:  *You experience new chest pain, pressure, squeezing or tightness.  *New or worsening cough, shortness of breath, or wheeze.  *If you are vomiting and cannot keep down fluids or your medications.  *You are getting dehydrated due to continued vomiting, diarrhea, or other reasons. Signs of dehydration include dry mouth, rapid heartbeat, or feeling dizzy or faint when standing.  *You see blood or dark/black material when you vomit or have a bowel movement.  *You experience burning when you urinate, have blood in your urine, or experience a discharge.  *Your pain is not improving within 8-12 hours or is not gone within 24 hours. Call or return immediately if your pain is getting worse, changes location, or moves to your chest or back.  *You have shaking chills, or fever greater than 101.5 degrees Fahrenheit or 38 degrees Celsius.  *Any change in your symptoms, or any new symptoms that concern you.

## 2025-01-28 NOTE — PROGRESS NOTE ADULT - SUBJECTIVE AND OBJECTIVE BOX
INTERVAL HPI/OVERNIGHT EVENTS: urine cx negative    SUBJECTIVE: Patient seen and examined at bedside by chief resident. Patient reports pain has improved with current medication regimen. Tolerating diet without nausea or vomiting. Patient last BM was 2 days ago. Denies fever, CP, SOB.    MEDICATIONS  (STANDING):  acetaminophen     Tablet .. 650 milliGRAM(s) Oral every 6 hours  carBAMazepine 200 milliGRAM(s) Oral daily  cefTRIAXone   IVPB 1000 milliGRAM(s) IV Intermittent every 24 hours  enoxaparin Injectable 30 milliGRAM(s) SubCutaneous every 24 hours  lactated ringers. 1000 milliLiter(s) (85 mL/Hr) IV Continuous <Continuous>  melatonin 5 milliGRAM(s) Oral at bedtime  pantoprazole    Tablet 40 milliGRAM(s) Oral before breakfast  venlafaxine 50 milliGRAM(s) Oral daily    MEDICATIONS  (PRN):  ALPRAZolam 0.5 milliGRAM(s) Oral at bedtime PRN Insomnia  HYDROmorphone  Injectable 0.2 milliGRAM(s) IV Push every 4 hours PRN Moderate Pain (4 - 6)  HYDROmorphone  Injectable 0.5 milliGRAM(s) IV Push every 4 hours PRN Severe Pain (7 - 10)  ondansetron Injectable 4 milliGRAM(s) IV Push every 6 hours PRN Nausea and/or Vomiting      Vital Signs Last 24 Hrs  T(C): 36.3 (28 Jan 2025 05:05), Max: 36.9 (27 Jan 2025 21:18)  T(F): 97.4 (28 Jan 2025 05:05), Max: 98.4 (27 Jan 2025 21:18)  HR: 78 (28 Jan 2025 05:05) (69 - 78)  BP: 100/68 (28 Jan 2025 05:05) (90/56 - 102/67)  BP(mean): --  RR: 17 (28 Jan 2025 05:05) (17 - 19)  SpO2: 99% (28 Jan 2025 05:05) (98% - 99%)    Parameters below as of 28 Jan 2025 05:05  Patient On (Oxygen Delivery Method): room air        PHYSICAL EXAM:    General: NAD, resting comfortably in bed    Respiratory: non labored breathing, no respiratory distress    Cardiovascular: NSR, RRR    Gastrointestinal: soft, nondistended, mild TTP, no rebound or guarding    Genitourinary: voiding    Extremities: (-) edema, SCDs in place                  I&O's Detail    27 Jan 2025 07:01  -  28 Jan 2025 07:00  --------------------------------------------------------  IN:    Lactated Ringers: 1275 mL    Oral Fluid: 240 mL  Total IN: 1515 mL    OUT:    Voided (mL): 1050 mL  Total OUT: 1050 mL    Total NET: 465 mL          LABS:                        10.6   5.62  )-----------( 182      ( 27 Jan 2025 05:30 )             35.0     01-27    140  |  105  |  10  ----------------------------<  88  4.2   |  28  |  0.60    Ca    8.5      27 Jan 2025 05:30  Phos  3.0     01-27  Mg     2.0     01-27    TPro  6.0  /  Alb  3.6  /  TBili  <0.2  /  DBili  <0.1  /  AST  90[H]  /  ALT  81[H]  /  AlkPhos  125[H]  01-27    PT/INR - ( 27 Jan 2025 05:30 )   PT: 11.4 sec;   INR: 0.99          PTT - ( 27 Jan 2025 05:30 )  PTT:42.2 sec  Urinalysis Basic - ( 27 Jan 2025 05:30 )    Color: x / Appearance: x / SG: x / pH: x  Gluc: 88 mg/dL / Ketone: x  / Bili: x / Urobili: x   Blood: x / Protein: x / Nitrite: x   Leuk Esterase: x / RBC: x / WBC x   Sq Epi: x / Non Sq Epi: x / Bacteria: x        RADIOLOGY & ADDITIONAL STUDIES:

## 2025-01-28 NOTE — DISCHARGE NOTE PROVIDER - HOSPITAL COURSE
54 y. o female with PMH of Anxiety, depression, hypothyroidism , Insulin Resistance syndrome ( as per patient ), Breast cancer s/p mastectomy and reconstruction presenting with abdominal pain. Patient has had episodic abdominal pain for last 2 months with 4 episodes. She believes the episodes are increasing in frequency with 2 in the last week(one last weekend and one today). Her pain started around 5 pm and is centered in the upper abdomen with radiation to the back like it always has. She had moderate relief with tylenol although her pain has rebounded while she has been in the ED.She is nauseous but has not vomited. In the ED, pt afebrile, nontachycardic, normotensive, and satting on RA. Labs significant for Transamnitis with alk phos 154, , ALT 56 although Tbili wnl(.3). CTAP with no abd pathology, possible cystitis. Admitted to surgery for further work-up. Patient's HIDA scan showed no evidence of acute cholecystitis. Her MRCP showed __________.   At time of discharge pt is tolerating diet, pain well controlled, pt is ambulating/voiding freely, having adequate bowel function. Pt is hemodynamically normal/stable and medically ready for discharge with outpatient follow-up.   53 y/o female with PMH of Anxiety, depression, hypothyroidism, Insulin Resistance syndrome (as per patient), Breast cancer s/p mastectomy and reconstruction presenting with abdominal pain. Patient has had episodic abdominal pain for last 2 months with 4 episodes. She believes the episodes are increasing in frequency with 2 in the last week(one last weekend and one on 1/26). Her pain started around 5 pm in the evening and is centered in the upper abdomen with radiation to the back like it always has. She had moderate relief with Tylenol although her pain has rebounded while she has been in the ED. She reported nausea but no vomiting. In the ED, pt afebrile, nontachycardic, normotensive, and satting on RA. Labs significant for transaminitis with alk phos 154, , ALT 56 although Tbili wnl(.3). CTAP with no abd pathology, possible cystitis. Admitted to surgery for further work-up. Patient's HIDA scan showed no evidence of acute cholecystitis. Her MRCP showed no biliary ductal dilatation, unremarkable gallbladder, no suspicious liver lesions, and no acute intraadbominal findings. GI was consulted and performed EGD and EUS. EGD revealed normal esophagus and stomach with random gastric biopsies, duodenal lymphatic congestion s/p biopsy. EUS revealed normal PD, normal CBD, possible incomplete pancreatic divisum, and mildly thickened gallbladder.     At time of discharge pt is tolerating diet, pain well controlled, pt is ambulating/voiding freely, having adequate bowel function. Pt is hemodynamically normal/stable and medically ready for discharge with outpatient follow-up.   53 y/o female with PMH of Anxiety, depression, hypothyroidism, Insulin Resistance syndrome (as per patient), Breast cancer s/p mastectomy and reconstruction presenting with abdominal pain. Patient has had episodic abdominal pain for last 2 months with 4 episodes. She believes the episodes are increasing in frequency with 2 in the last week(one last weekend and one on 1/26). Her pain started around 5 pm in the evening and is centered in the upper abdomen with radiation to the back like it always has. She had moderate relief with Tylenol although her pain has rebounded while she has been in the ED. She reported nausea but no vomiting. In the ED, pt afebrile, nontachycardic, normotensive, and satting on RA. Labs significant for transaminitis with alk phos 154, , ALT 56 although Tbili wnl(.3). CTAP with no abd pathology, possible cystitis. Admitted to surgery for further work-up. Patient's HIDA scan showed no evidence of acute cholecystitis. Her MRCP showed no biliary ductal dilatation, unremarkable gallbladder, no suspicious liver lesions, and no acute intra-abdominal findings. GI was consulted and performed EGD and EUS. EGD revealed normal esophagus and stomach with random gastric biopsies, duodenal lymphatic congestion s/p biopsy. EUS revealed normal PD, normal CBD, possible incomplete pancreatic divisum, and mildly thickened gallbladder. Patient then underwent a laparoscopic cholecystectomy on 1/29. The procedure was uncomplicated and her post operative course was uneventful.     At time of discharge pt is tolerating diet, pain well controlled, pt is ambulating/voiding freely, having adequate bowel function. Pt is hemodynamically normal/stable and medically ready for discharge with outpatient follow-up.

## 2025-01-29 ENCOUNTER — RESULT REVIEW (OUTPATIENT)
Age: 55
End: 2025-01-29

## 2025-01-29 ENCOUNTER — TRANSCRIPTION ENCOUNTER (OUTPATIENT)
Age: 55
End: 2025-01-29

## 2025-01-29 LAB
BLD GP AB SCN SERPL QL: NEGATIVE — SIGNIFICANT CHANGE UP
RH IG SCN BLD-IMP: POSITIVE — SIGNIFICANT CHANGE UP

## 2025-01-29 PROCEDURE — 88304 TISSUE EXAM BY PATHOLOGIST: CPT | Mod: 26,59

## 2025-01-29 PROCEDURE — 43242 EGD US FINE NEEDLE BX/ASPIR: CPT | Mod: GC

## 2025-01-29 PROCEDURE — 47562 LAPAROSCOPIC CHOLECYSTECTOMY: CPT

## 2025-01-29 PROCEDURE — 88305 TISSUE EXAM BY PATHOLOGIST: CPT | Mod: 26

## 2025-01-29 PROCEDURE — 43239 EGD BIOPSY SINGLE/MULTIPLE: CPT | Mod: 59,GC

## 2025-01-29 RX ORDER — LIDOCAINE HYDROCHLORIDE 30 MG/G
1 CREAM TOPICAL ONCE
Refills: 0 | Status: DISCONTINUED | OUTPATIENT
Start: 2025-01-29 | End: 2025-01-30

## 2025-01-29 RX ORDER — VENLAFAXINE HCL 75 MG
50 TABLET ORAL DAILY
Refills: 0 | Status: DISCONTINUED | OUTPATIENT
Start: 2025-01-30 | End: 2025-01-30

## 2025-01-29 RX ORDER — ONDANSETRON 4 MG/1
4 TABLET, ORALLY DISINTEGRATING ORAL EVERY 6 HOURS
Refills: 0 | Status: DISCONTINUED | OUTPATIENT
Start: 2025-01-29 | End: 2025-01-30

## 2025-01-29 RX ORDER — ALPRAZOLAM 2 MG
0.5 TABLET ORAL AT BEDTIME
Refills: 0 | Status: DISCONTINUED | OUTPATIENT
Start: 2025-01-29 | End: 2025-01-30

## 2025-01-29 RX ORDER — HYDROMORPHONE HYDROCHLORIDE 4 MG/ML
0.5 INJECTION, SOLUTION INTRAMUSCULAR; INTRAVENOUS; SUBCUTANEOUS ONCE
Refills: 0 | Status: DISCONTINUED | OUTPATIENT
Start: 2025-01-29 | End: 2025-01-29

## 2025-01-29 RX ORDER — HYDROMORPHONE HYDROCHLORIDE 4 MG/ML
0.2 INJECTION, SOLUTION INTRAMUSCULAR; INTRAVENOUS; SUBCUTANEOUS EVERY 6 HOURS
Refills: 0 | Status: DISCONTINUED | OUTPATIENT
Start: 2025-01-29 | End: 2025-01-29

## 2025-01-29 RX ORDER — CARBAMAZEPINE 200 MG
200 TABLET ORAL DAILY
Refills: 0 | Status: DISCONTINUED | OUTPATIENT
Start: 2025-01-30 | End: 2025-01-30

## 2025-01-29 RX ORDER — ACETAMINOPHEN, DIPHENHYDRAMINE HCL, PHENYLEPHRINE HCL 325; 25; 5 MG/1; MG/1; MG/1
5 TABLET ORAL AT BEDTIME
Refills: 0 | Status: DISCONTINUED | OUTPATIENT
Start: 2025-01-29 | End: 2025-01-30

## 2025-01-29 RX ORDER — ACETAMINOPHEN 160 MG/5ML
650 SUSPENSION ORAL EVERY 6 HOURS
Refills: 0 | Status: DISCONTINUED | OUTPATIENT
Start: 2025-01-29 | End: 2025-01-30

## 2025-01-29 RX ORDER — SODIUM CHLORIDE 9 G/ML
1000 INJECTION, SOLUTION INTRAVENOUS
Refills: 0 | Status: DISCONTINUED | OUTPATIENT
Start: 2025-01-29 | End: 2025-01-30

## 2025-01-29 RX ORDER — HYDROMORPHONE HYDROCHLORIDE 4 MG/ML
0.5 INJECTION, SOLUTION INTRAMUSCULAR; INTRAVENOUS; SUBCUTANEOUS EVERY 6 HOURS
Refills: 0 | Status: DISCONTINUED | OUTPATIENT
Start: 2025-01-29 | End: 2025-01-30

## 2025-01-29 RX ORDER — PANTOPRAZOLE 20 MG/1
40 TABLET, DELAYED RELEASE ORAL
Refills: 0 | Status: DISCONTINUED | OUTPATIENT
Start: 2025-01-29 | End: 2025-01-30

## 2025-01-29 RX ORDER — HYDROMORPHONE HYDROCHLORIDE 4 MG/ML
0.25 INJECTION, SOLUTION INTRAMUSCULAR; INTRAVENOUS; SUBCUTANEOUS EVERY 4 HOURS
Refills: 0 | Status: DISCONTINUED | OUTPATIENT
Start: 2025-01-29 | End: 2025-01-30

## 2025-01-29 RX ADMIN — SODIUM CHLORIDE 85 MILLILITER(S): 9 INJECTION, SOLUTION INTRAVENOUS at 00:03

## 2025-01-29 RX ADMIN — HYDROMORPHONE HYDROCHLORIDE 0.5 MILLIGRAM(S): 4 INJECTION, SOLUTION INTRAMUSCULAR; INTRAVENOUS; SUBCUTANEOUS at 10:06

## 2025-01-29 RX ADMIN — ACETAMINOPHEN 650 MILLIGRAM(S): 160 SUSPENSION ORAL at 19:34

## 2025-01-29 RX ADMIN — HYDROMORPHONE HYDROCHLORIDE 0.5 MILLIGRAM(S): 4 INJECTION, SOLUTION INTRAMUSCULAR; INTRAVENOUS; SUBCUTANEOUS at 11:06

## 2025-01-29 RX ADMIN — HYDROMORPHONE HYDROCHLORIDE 0.25 MILLIGRAM(S): 4 INJECTION, SOLUTION INTRAMUSCULAR; INTRAVENOUS; SUBCUTANEOUS at 23:00

## 2025-01-29 RX ADMIN — HYDROMORPHONE HYDROCHLORIDE 0.5 MILLIGRAM(S): 4 INJECTION, SOLUTION INTRAMUSCULAR; INTRAVENOUS; SUBCUTANEOUS at 00:03

## 2025-01-29 RX ADMIN — ONDANSETRON 4 MILLIGRAM(S): 4 TABLET, ORALLY DISINTEGRATING ORAL at 17:06

## 2025-01-29 RX ADMIN — HYDROMORPHONE HYDROCHLORIDE 0.5 MILLIGRAM(S): 4 INJECTION, SOLUTION INTRAMUSCULAR; INTRAVENOUS; SUBCUTANEOUS at 00:45

## 2025-01-29 RX ADMIN — SODIUM CHLORIDE 85 MILLILITER(S): 9 INJECTION, SOLUTION INTRAVENOUS at 10:20

## 2025-01-29 RX ADMIN — CEFTRIAXONE 100 MILLIGRAM(S): 250 INJECTION, POWDER, FOR SOLUTION INTRAMUSCULAR; INTRAVENOUS at 04:00

## 2025-01-29 RX ADMIN — HYDROMORPHONE HYDROCHLORIDE 0.25 MILLIGRAM(S): 4 INJECTION, SOLUTION INTRAMUSCULAR; INTRAVENOUS; SUBCUTANEOUS at 22:30

## 2025-01-29 RX ADMIN — HYDROMORPHONE HYDROCHLORIDE 0.5 MILLIGRAM(S): 4 INJECTION, SOLUTION INTRAMUSCULAR; INTRAVENOUS; SUBCUTANEOUS at 17:28

## 2025-01-29 RX ADMIN — HYDROMORPHONE HYDROCHLORIDE 0.5 MILLIGRAM(S): 4 INJECTION, SOLUTION INTRAMUSCULAR; INTRAVENOUS; SUBCUTANEOUS at 23:26

## 2025-01-29 RX ADMIN — HYDROMORPHONE HYDROCHLORIDE 0.5 MILLIGRAM(S): 4 INJECTION, SOLUTION INTRAMUSCULAR; INTRAVENOUS; SUBCUTANEOUS at 17:42

## 2025-01-29 RX ADMIN — HYDROMORPHONE HYDROCHLORIDE 0.5 MILLIGRAM(S): 4 INJECTION, SOLUTION INTRAMUSCULAR; INTRAVENOUS; SUBCUTANEOUS at 23:56

## 2025-01-29 NOTE — PRE-ANESTHESIA EVALUATION ADULT - NSANTHOSAYNRD_GEN_A_CORE
No. JOANNA screening performed.  STOP BANG Legend: 0-2 = LOW Risk; 3-4 = INTERMEDIATE Risk; 5-8 = HIGH Risk
No. JOANNA screening performed.  STOP BANG Legend: 0-2 = LOW Risk; 3-4 = INTERMEDIATE Risk; 5-8 = HIGH Risk

## 2025-01-29 NOTE — PRE-OP CHECKLIST - BSA (M2)
"10/12/2020       RE: Cynthia Crockett  [address] [facility] [address]     Dear Colleague,    Thank you for referring your patient, Cynthia Crockett, to the [facility] [place] at [facility]. Please see a copy of my visit note below.    Gyn Clinic Postoperative Visit Note  10/12/2020    S: Cynthia Crockett is a 41 year old  here for post-operative visit following DaTLH, LSO on 20. She reports feeling well aside from significant HF and night sweats.  She desires treatment if possible.  Also notes left vulvar bump and wants evaluation. She reports, \"doing surgery on it\" at home.     O:   /84 (BP Location: Right arm, Patient Position: Chair)   Pulse 81   Temp 98.1  F (36.7  C) (Oral)   Wt 94.6 kg (208 lb 8 oz)   LMP 2018 (Approximate)   Breastfeeding No   BMI 30.78 kg/m    Exam: incisions cdi  Vaginal exam shows well healing cuff.  Small follicle on left vulva. Appears to have been drained. No redness or erythema.     Labs:   Hemoglobin   Date Value Ref Range Status   2020 13.2 11.7 - 15.7 g/dL Final   ]    Pathology:   Copath Report   Date Value Ref Range Status   2020   Final    Patient Name: CYNTHIA CROCKETT  MR#: [id number]  Specimen #: [id number]  Collected: 2020  Received: 2020  Reported: 2020 15:44  Ordering Phy(s): RAKEL DECKER    For improved result formatting, select 'View Enhanced Report Format' under   Linked Documents section.    SPECIMEN(S):  Uterus, Cervix, left ovary    FINAL DIAGNOSIS:  UTERUS, CERVIX, LEFT OVARY, TOTAL HYSTERECTOMY AND LEFT OOPHORECTOMY:  - Proliferative-type endometrium  - Anterior and posterior cervix with squamous metaplasia and chronic   cervicitis  - Adenomyosis  - Uterine serosal adhesions  - Left ovary with hemorrhagic follicular cyst(s)  - Negative for hyperplasia or malignancy    I have personally reviewed all specimens and/or slides, including the   listed " "special stains, and used them  with my medical judgement to determine or confirm the final diagnosis.    Electronically signed out by:    Ar Langford M.D., PhD, Physicians    CLINICAL HISTORY:  41 year old with endometriosis and chronic pelvic pain. Previous right   ovary and bilateral fallopian tubes  excision with endometriosis. Most recent ultrasound showed no   abnormalities. NILM on previous PAP.    GROSS:  The specimen is received fresh with proper patient identification, labeled   \"uterus, cervix, left ovary\".  The  specimen consists of a 154.7 g, 8.7 cm (fundus to ectocervix) by 5.1 cm   (cornu to cornu) by 3.8 cm (anterior  to posterior) hysterectomy with left sided ovary (5.8 x 4.4 x 4.2 cm).  No   fallopian tubes are present with  the specimen.  The serosal surface is tan, smooth and glistening.  The   paracervical margins are inked black.  The ectocervix is tan and smooth without lesions.  The os is a 1.3 cm   slit.  The endocervical canal is grossly  unremarkable.  The endometrial cavity is 4.9 cm in length and 2.8 cm in   greatest width.  The endometrium is  0.1 cm in thickness and is tan and grossly unremarkable.  No polyps or   masses are present.  The myometrium  ranges from 1.3-2.1 cm in thickness and is unremarkable without bulging   nodule.  The external surface of the  ovary is purple- gray and smooth.  No papillary excrescences are present.    It is sectioned to reveal a  unilocular cyst with brown thin fluid.  No papillary excrescences or firm   areas are present.  A thin rim of  normal ovarian parenchyma is grossly identified.  Representative sections   are submitted.    Summary of Sections:  A1-A2 - anterior cervix and lower uterine segment, bisected with blue ink  A3-A4 - posterior cervix and lower uterine segment, bisected with blue ink  A5-A6 - anterior uterine wall, full-thickness sections  A7-A8 - posterior uterine wall, full-thickness sections  A9-A11 - ovarian cyst wall " (Dictated by: Kayli JONES ASCP 9/2/2020   01:26 PM)    MICROSCOPIC:  Microscopic examination was performed.    The technical component of this testing was completed at the [facility], with the professional component performed   at the [facility], [address] [place] [address],   [place] [address] ([phone])    CPT Codes:  A: 76675-HC1    COLLECTION SITE:  Client: [facility]  Location: [facility] (B)    Resident  TORY     ]    A: 41 year old female PO x 6 wks s/p DaTLH BSO.  Doing well aside from significant HF. Desires tx.     P:    Vivelle Dot rx sent. Discussed lowest dose is plan.  Discussed risk of DVT/HTN.  Pt aware and desires to proceed.    Hayley Zayas MD, FACOG  (she/her/hers)    Department of Ob/Gyn/Women's Health  [facility]  [facility]  [address] [place], [place] [address]. [place] [address]  [email or web address]  p. [phone]  f. [phone]         1.44

## 2025-01-29 NOTE — PROGRESS NOTE ADULT - ATTENDING COMMENTS
History and physical as noted.  Underwent endoscopic ultrasound in addition to previous MRI, CCK gallbladder emptying study, and CT scan of the abdomen.  No evidence for any biliary stones or sludge.  The only objective abnormality was the abnormal liver functions on admission with  with an elevated alkaline phosphatase and OT and PT which normalized at 24 hours suggesting the possibility of passage of a stone or sludge.  Long discussion was had with the patient and her  about options.  1 option would be to be discharged home with continued observation given the fact that we could not definitively identify any biliary debris.  The other option is to proceed with a cholecystectomy with the understanding that it may not in fact solve her attacks given that we have no clear evidence of biliary debris.  Patient and the family expressed understanding and would like to proceed with a laparoscopic cholecystectomy.  Risks and benefits of the procedure discussed including but not limited to postoperative bleeding, infection, biliary injury.  All questions answered.  We will proceed later today with surgery.

## 2025-01-29 NOTE — BRIEF OPERATIVE NOTE - OPERATION/FINDINGS
Pt placed in reverse Trendelenburg w/ right side up. Omental attachments to GB were gently swept away. GB fundus retracted superiorly over dome of liver. Filmy adhesions between the GB & omentum/duo cauterized. GB infundibulum retracted laterally towards RLQ exposing Calot’s triangle. Critical view of safety obtained. Cystic duct and artery clipped and divided. Peritoneal attachments btw GB & liver bed  w/ electrocautery. Hemostasis achieved. No leakage of bile from cystic duct stump.

## 2025-01-29 NOTE — CHART NOTE - NSCHARTNOTEFT_GEN_A_CORE
Patient is s/p EGD/EUS performed in Endoscopy Unit with Dr. Botello    EGD:  normal esophagus and stomach with random gastric biopsies, duodenal lymphactic congestion s/p biopsy    EUS:   nl PD, nl CBD, possible incomplete pancreatic divisum, mildly thickened gallbladder wall    Recommendations:  return to floor  f/u pathology  diet per primary based on surgical planning    Thank you for the courtesy of this consult. We will follow along with you.    Demario Cool M.D.  Gastroenterology Fellow  GI Consult Weekday 7am-5pm Pager: [phone]  Weeknights/Weekend/Holiday Coverage: Please Call the  for contact info  Follow Up Questions welcome via [place] [employer] Teams Messenger

## 2025-01-29 NOTE — PROGRESS NOTE ADULT - SUBJECTIVE AND OBJECTIVE BOX
Procedure: laparoscopic cholecystectomy  Surgeon: Dr. Qureshi    S: Pt seen and examined at bedside. Patient is lying comfortably in bed with some gas related pain radiating to her mid chest. Tolerating diet, pain well controlled with current regimen. Patient denies fever, nausea, vomiting, and shortness of breath. Patient is not yet passing gas or having bowel movements.     O:  T(C): 36.9 (01-29-25 @ 19:20), Max: 36.9 (01-29-25 @ 19:20)  T(F): 98.4 (01-29-25 @ 19:20), Max: 98.4 (01-29-25 @ 19:20)  HR: 91 (01-29-25 @ 19:20) (77 - 91)  BP: 119/79 (01-29-25 @ 19:20) (109/70 - 135/88)  RR: 16 (01-29-25 @ 19:20) (8 - 19)  SpO2: 97% (01-29-25 @ 19:20) (95% - 100%)  Wt(kg): --                        11.7   4.34  )-----------( 181      ( 29 Jan 2025 10:20 )             36.6     01-29    140  |  104  |  10  ----------------------------<  83  3.8   |  27  |  0.58    Ca    8.6      29 Jan 2025 10:20  Phos  3.2     01-29  Mg     1.9     01-29    TPro  6.4  /  Alb  3.7  /  TBili  <0.2  /  DBili  <0.1  /  AST  17  /  ALT  41  /  AlkPhos  103  01-29      Physical Exam  General: Patient is doing well and lying in bed comfortably  Constitutional: alert and oriented   Pulm: Nonlabored breathing, no respiratory distress  CV: Regular rate and rhythm, normal sinus rhythm  Abd:  soft, nontender, nondistended. No rebound, no guarding.             Incisions: clean, dry, intact and healing well, no erythema/induration/edema  Extremities: warm, well perfused, no edema

## 2025-01-29 NOTE — PRE-ANESTHESIA EVALUATION ADULT - NSANTHPEFT_GEN_ALL_CORE
General: NAD  Neuro: A&Ox3  CV: S1 S2, exercise tolerance > 4 METS  Pulm: breathing is bilateral and non-labored on room air
.lpe

## 2025-01-29 NOTE — PRE-ANESTHESIA EVALUATION ADULT - MALLAMPATI CLASS
Class II - visualization of the soft palate, fauces, and uvula
Class II - visualization of the soft palate, fauces, and uvula
18

## 2025-01-29 NOTE — PROGRESS NOTE ADULT - SUBJECTIVE AND OBJECTIVE BOX
SUBJECTIVE: Patient seen on morning rounds with chief resident resting comfortably in bed. Patient has no acute complaints at this time. Pt's pain is well controlled on current regimen. +fl/+bm. Denies n/v.        MEDICATIONS  (STANDING):  acetaminophen     Tablet .. 650 milliGRAM(s) Oral every 6 hours  carBAMazepine 200 milliGRAM(s) Oral daily  enoxaparin Injectable 30 milliGRAM(s) SubCutaneous every 24 hours  lactated ringers. 1000 milliLiter(s) (85 mL/Hr) IV Continuous <Continuous>  melatonin 5 milliGRAM(s) Oral at bedtime  pantoprazole    Tablet 40 milliGRAM(s) Oral before breakfast  venlafaxine 50 milliGRAM(s) Oral daily    MEDICATIONS  (PRN):  ALPRAZolam 0.5 milliGRAM(s) Oral at bedtime PRN Insomnia  HYDROmorphone  Injectable 0.2 milliGRAM(s) IV Push every 4 hours PRN Moderate Pain (4 - 6)  HYDROmorphone  Injectable 0.5 milliGRAM(s) IV Push every 4 hours PRN Severe Pain (7 - 10)  ondansetron Injectable 4 milliGRAM(s) IV Push every 6 hours PRN Nausea and/or Vomiting      Vital Signs Last 24 Hrs  T(C): 37.1 (29 Jan 2025 07:21), Max: 37.1 (29 Jan 2025 04:17)  T(F): 98.7 (29 Jan 2025 04:17), Max: 98.7 (29 Jan 2025 04:17)  HR: 101 (29 Jan 2025 07:21) (67 - 101)  BP: 106/63 (29 Jan 2025 07:21) (92/60 - 106/63)  BP(mean): 79 (29 Jan 2025 07:21) (79 - 79)  RR: 18 (29 Jan 2025 07:21) (17 - 18)  SpO2: 99% (29 Jan 2025 07:21) (99% - 100%)    Parameters below as of 29 Jan 2025 04:17  Patient On (Oxygen Delivery Method): room air        PHYSICAL EXAM:    Constitutional: A&Ox3    Respiratory: non labored breathing, no respiratory distress    Gastrointestinal:    Genitourinary: voiding    Extremities: (-) edema                  I&O's Detail    28 Jan 2025 07:01  -  29 Jan 2025 07:00  --------------------------------------------------------  IN:    IV PiggyBack: 100 mL    Lactated Ringers: 1105 mL    Oral Fluid: 1560 mL  Total IN: 2765 mL    OUT:    Voided (mL): 400 mL  Total OUT: 400 mL    Total NET: 2365 mL          LABS:                        11.0   3.98  )-----------( 172      ( 28 Jan 2025 08:06 )             35.9     01-28    140  |  103  |  10  ----------------------------<  82  3.7   |  30  |  0.61    Ca    8.8      28 Jan 2025 08:06  Phos  3.0     01-28  Mg     1.8     01-28    TPro  6.0  /  Alb  3.6  /  TBili  0.2  /  DBili  x   /  AST  29  /  ALT  52[H]  /  AlkPhos  105  01-28      Urinalysis Basic - ( 28 Jan 2025 08:06 )    Color: x / Appearance: x / SG: x / pH: x  Gluc: 82 mg/dL / Ketone: x  / Bili: x / Urobili: x   Blood: x / Protein: x / Nitrite: x   Leuk Esterase: x / RBC: x / WBC x   Sq Epi: x / Non Sq Epi: x / Bacteria: x        RADIOLOGY & ADDITIONAL STUDIES: SUBJECTIVE: Patient seen on morning rounds with chief resident resting comfortably in bed. Patient has no acute complaints at this time. Pt's pain is well controlled on current regimen. +fl/+bm. Denies n/v.        MEDICATIONS  (STANDING):  acetaminophen     Tablet .. 650 milliGRAM(s) Oral every 6 hours  carBAMazepine 200 milliGRAM(s) Oral daily  enoxaparin Injectable 30 milliGRAM(s) SubCutaneous every 24 hours  lactated ringers. 1000 milliLiter(s) (85 mL/Hr) IV Continuous <Continuous>  melatonin 5 milliGRAM(s) Oral at bedtime  pantoprazole    Tablet 40 milliGRAM(s) Oral before breakfast  venlafaxine 50 milliGRAM(s) Oral daily    MEDICATIONS  (PRN):  ALPRAZolam 0.5 milliGRAM(s) Oral at bedtime PRN Insomnia  HYDROmorphone  Injectable 0.2 milliGRAM(s) IV Push every 4 hours PRN Moderate Pain (4 - 6)  HYDROmorphone  Injectable 0.5 milliGRAM(s) IV Push every 4 hours PRN Severe Pain (7 - 10)  ondansetron Injectable 4 milliGRAM(s) IV Push every 6 hours PRN Nausea and/or Vomiting      Vital Signs Last 24 Hrs  T(C): 37.1 (29 Jan 2025 07:21), Max: 37.1 (29 Jan 2025 04:17)  T(F): 98.7 (29 Jan 2025 04:17), Max: 98.7 (29 Jan 2025 04:17)  HR: 101 (29 Jan 2025 07:21) (67 - 101)  BP: 106/63 (29 Jan 2025 07:21) (92/60 - 106/63)  BP(mean): 79 (29 Jan 2025 07:21) (79 - 79)  RR: 18 (29 Jan 2025 07:21) (17 - 18)  SpO2: 99% (29 Jan 2025 07:21) (99% - 100%)    Parameters below as of 29 Jan 2025 04:17  Patient On (Oxygen Delivery Method): room air        PHYSICAL EXAM:    Constitutional: A&Ox3    Respiratory: non labored breathing, no respiratory distress    Gastrointestinal: soft, nondistended, minimally TTP, no rebound or guarding    Genitourinary: voiding    Extremities: (-) edema, SCDs in place                  I&O's Detail    28 Jan 2025 07:01  -  29 Jan 2025 07:00  --------------------------------------------------------  IN:    IV PiggyBack: 100 mL    Lactated Ringers: 1105 mL    Oral Fluid: 1560 mL  Total IN: 2765 mL    OUT:    Voided (mL): 400 mL  Total OUT: 400 mL    Total NET: 2365 mL          LABS:                        11.0   3.98  )-----------( 172      ( 28 Jan 2025 08:06 )             35.9     01-28    140  |  103  |  10  ----------------------------<  82  3.7   |  30  |  0.61    Ca    8.8      28 Jan 2025 08:06  Phos  3.0     01-28  Mg     1.8     01-28    TPro  6.0  /  Alb  3.6  /  TBili  0.2  /  DBili  x   /  AST  29  /  ALT  52[H]  /  AlkPhos  105  01-28      Urinalysis Basic - ( 28 Jan 2025 08:06 )    Color: x / Appearance: x / SG: x / pH: x  Gluc: 82 mg/dL / Ketone: x  / Bili: x / Urobili: x   Blood: x / Protein: x / Nitrite: x   Leuk Esterase: x / RBC: x / WBC x   Sq Epi: x / Non Sq Epi: x / Bacteria: x        RADIOLOGY & ADDITIONAL STUDIES:

## 2025-01-30 ENCOUNTER — NON-APPOINTMENT (OUTPATIENT)
Age: 55
End: 2025-01-30

## 2025-01-30 ENCOUNTER — TRANSCRIPTION ENCOUNTER (OUTPATIENT)
Age: 55
End: 2025-01-30

## 2025-01-30 VITALS
DIASTOLIC BLOOD PRESSURE: 78 MMHG | RESPIRATION RATE: 18 BRPM | HEART RATE: 87 BPM | SYSTOLIC BLOOD PRESSURE: 112 MMHG | TEMPERATURE: 99 F | OXYGEN SATURATION: 99 %

## 2025-01-30 DIAGNOSIS — Z09 ENCOUNTER FOR FOLLOW-UP EXAMINATION AFTER COMPLETED TREATMENT FOR CONDITIONS OTHER THAN MALIGNANT NEOPLASM: ICD-10-CM

## 2025-01-30 LAB
ALBUMIN SERPL ELPH-MCNC: 3.6 G/DL — SIGNIFICANT CHANGE UP (ref 3.3–5)
ALP SERPL-CCNC: 84 U/L — SIGNIFICANT CHANGE UP (ref 40–120)
ALT FLD-CCNC: 68 U/L — HIGH (ref 10–45)
ANION GAP SERPL CALC-SCNC: 9 MMOL/L — SIGNIFICANT CHANGE UP (ref 5–17)
AST SERPL-CCNC: 46 U/L — HIGH (ref 10–40)
BILIRUB DIRECT SERPL-MCNC: 0.1 MG/DL — SIGNIFICANT CHANGE UP (ref 0–0.3)
BILIRUB INDIRECT FLD-MCNC: 0.2 MG/DL — SIGNIFICANT CHANGE UP (ref 0.2–1)
BILIRUB SERPL-MCNC: 0.3 MG/DL — SIGNIFICANT CHANGE UP (ref 0.2–1.2)
BUN SERPL-MCNC: 6 MG/DL — LOW (ref 7–23)
CALCIUM SERPL-MCNC: 8.7 MG/DL — SIGNIFICANT CHANGE UP (ref 8.4–10.5)
CHLORIDE SERPL-SCNC: 100 MMOL/L — SIGNIFICANT CHANGE UP (ref 96–108)
CO2 SERPL-SCNC: 30 MMOL/L — SIGNIFICANT CHANGE UP (ref 22–31)
CREAT SERPL-MCNC: 0.62 MG/DL — SIGNIFICANT CHANGE UP (ref 0.5–1.3)
EGFR: 106 ML/MIN/1.73M2 — SIGNIFICANT CHANGE UP
GLUCOSE SERPL-MCNC: 94 MG/DL — SIGNIFICANT CHANGE UP (ref 70–99)
HCT VFR BLD CALC: 35.4 % — SIGNIFICANT CHANGE UP (ref 34.5–45)
HGB BLD-MCNC: 11 G/DL — LOW (ref 11.5–15.5)
MAGNESIUM SERPL-MCNC: 1.6 MG/DL — SIGNIFICANT CHANGE UP (ref 1.6–2.6)
MCHC RBC-ENTMCNC: 28.6 PG — SIGNIFICANT CHANGE UP (ref 27–34)
MCHC RBC-ENTMCNC: 31.1 G/DL — LOW (ref 32–36)
MCV RBC AUTO: 91.9 FL — SIGNIFICANT CHANGE UP (ref 80–100)
NRBC # BLD: 0 /100 WBCS — SIGNIFICANT CHANGE UP (ref 0–0)
NRBC BLD-RTO: 0 /100 WBCS — SIGNIFICANT CHANGE UP (ref 0–0)
PHOSPHATE SERPL-MCNC: 2.6 MG/DL — SIGNIFICANT CHANGE UP (ref 2.5–4.5)
PLATELET # BLD AUTO: 192 K/UL — SIGNIFICANT CHANGE UP (ref 150–400)
POTASSIUM SERPL-MCNC: 3.8 MMOL/L — SIGNIFICANT CHANGE UP (ref 3.5–5.3)
POTASSIUM SERPL-SCNC: 3.8 MMOL/L — SIGNIFICANT CHANGE UP (ref 3.5–5.3)
PROT SERPL-MCNC: 6.1 G/DL — SIGNIFICANT CHANGE UP (ref 6–8.3)
RBC # BLD: 3.85 M/UL — SIGNIFICANT CHANGE UP (ref 3.8–5.2)
RBC # FLD: 13.3 % — SIGNIFICANT CHANGE UP (ref 10.3–14.5)
SODIUM SERPL-SCNC: 139 MMOL/L — SIGNIFICANT CHANGE UP (ref 135–145)
WBC # BLD: 7.64 K/UL — SIGNIFICANT CHANGE UP (ref 3.8–10.5)
WBC # FLD AUTO: 7.64 K/UL — SIGNIFICANT CHANGE UP (ref 3.8–10.5)

## 2025-01-30 PROCEDURE — 86255 FLUORESCENT ANTIBODY SCREEN: CPT

## 2025-01-30 PROCEDURE — 87340 HEPATITIS B SURFACE AG IA: CPT

## 2025-01-30 PROCEDURE — A9537: CPT

## 2025-01-30 PROCEDURE — 83735 ASSAY OF MAGNESIUM: CPT

## 2025-01-30 PROCEDURE — 86704 HEP B CORE ANTIBODY TOTAL: CPT

## 2025-01-30 PROCEDURE — 82784 ASSAY IGA/IGD/IGG/IGM EACH: CPT

## 2025-01-30 PROCEDURE — 86850 RBC ANTIBODY SCREEN: CPT

## 2025-01-30 PROCEDURE — 85730 THROMBOPLASTIN TIME PARTIAL: CPT

## 2025-01-30 PROCEDURE — 99285 EMERGENCY DEPT VISIT HI MDM: CPT

## 2025-01-30 PROCEDURE — 88304 TISSUE EXAM BY PATHOLOGIST: CPT

## 2025-01-30 PROCEDURE — 86901 BLOOD TYPING SEROLOGIC RH(D): CPT

## 2025-01-30 PROCEDURE — 86706 HEP B SURFACE ANTIBODY: CPT

## 2025-01-30 PROCEDURE — 86803 HEPATITIS C AB TEST: CPT

## 2025-01-30 PROCEDURE — 86705 HEP B CORE ANTIBODY IGM: CPT

## 2025-01-30 PROCEDURE — A9585: CPT

## 2025-01-30 PROCEDURE — 86709 HEPATITIS A IGM ANTIBODY: CPT

## 2025-01-30 PROCEDURE — 82787 IGG 1 2 3 OR 4 EACH: CPT

## 2025-01-30 PROCEDURE — C9399: CPT

## 2025-01-30 PROCEDURE — 85025 COMPLETE CBC W/AUTO DIFF WBC: CPT

## 2025-01-30 PROCEDURE — 86900 BLOOD TYPING SEROLOGIC ABO: CPT

## 2025-01-30 PROCEDURE — 78227 HEPATOBIL SYST IMAGE W/DRUG: CPT | Mod: MC

## 2025-01-30 PROCEDURE — 88305 TISSUE EXAM BY PATHOLOGIST: CPT

## 2025-01-30 PROCEDURE — 85610 PROTHROMBIN TIME: CPT

## 2025-01-30 PROCEDURE — 84100 ASSAY OF PHOSPHORUS: CPT

## 2025-01-30 PROCEDURE — 80076 HEPATIC FUNCTION PANEL: CPT

## 2025-01-30 PROCEDURE — 83690 ASSAY OF LIPASE: CPT

## 2025-01-30 PROCEDURE — 87086 URINE CULTURE/COLONY COUNT: CPT

## 2025-01-30 PROCEDURE — 93005 ELECTROCARDIOGRAM TRACING: CPT

## 2025-01-30 PROCEDURE — 83605 ASSAY OF LACTIC ACID: CPT

## 2025-01-30 PROCEDURE — 85027 COMPLETE CBC AUTOMATED: CPT

## 2025-01-30 PROCEDURE — 74177 CT ABD & PELVIS W/CONTRAST: CPT | Mod: MC

## 2025-01-30 PROCEDURE — 81001 URINALYSIS AUTO W/SCOPE: CPT

## 2025-01-30 PROCEDURE — 74183 MRI ABD W/O CNTR FLWD CNTR: CPT | Mod: MC

## 2025-01-30 PROCEDURE — 84443 ASSAY THYROID STIM HORMONE: CPT

## 2025-01-30 PROCEDURE — 82977 ASSAY OF GGT: CPT

## 2025-01-30 PROCEDURE — 80053 COMPREHEN METABOLIC PANEL: CPT

## 2025-01-30 PROCEDURE — 36415 COLL VENOUS BLD VENIPUNCTURE: CPT

## 2025-01-30 PROCEDURE — 84702 CHORIONIC GONADOTROPIN TEST: CPT

## 2025-01-30 PROCEDURE — 80048 BASIC METABOLIC PNL TOTAL CA: CPT

## 2025-01-30 PROCEDURE — 84436 ASSAY OF TOTAL THYROXINE: CPT

## 2025-01-30 PROCEDURE — 71045 X-RAY EXAM CHEST 1 VIEW: CPT

## 2025-01-30 RX ORDER — HYDROMORPHONE HYDROCHLORIDE 4 MG/ML
0.5 INJECTION, SOLUTION INTRAMUSCULAR; INTRAVENOUS; SUBCUTANEOUS EVERY 4 HOURS
Refills: 0 | Status: DISCONTINUED | OUTPATIENT
Start: 2025-01-30 | End: 2025-01-30

## 2025-01-30 RX ORDER — HYDROMORPHONE HYDROCHLORIDE 4 MG/ML
0.25 INJECTION, SOLUTION INTRAMUSCULAR; INTRAVENOUS; SUBCUTANEOUS ONCE
Refills: 0 | Status: DISCONTINUED | OUTPATIENT
Start: 2025-01-30 | End: 2025-01-30

## 2025-01-30 RX ORDER — OXYCODONE HYDROCHLORIDE AND ACETAMINOPHEN 5; 325 MG/1; MG/1
2 TABLET ORAL
Qty: 60 | Refills: 0
Start: 2025-01-30 | End: 2025-02-03

## 2025-01-30 RX ORDER — OXYCODONE AND ACETAMINOPHEN 5; 325 MG/1; MG/1
5-325 TABLET ORAL
Qty: 60 | Refills: 0 | Status: ACTIVE | COMMUNITY
Start: 2025-01-30 | End: 1900-01-01

## 2025-01-30 RX ORDER — ACETAMINOPHEN 160 MG/5ML
650 SUSPENSION ORAL EVERY 6 HOURS
Refills: 0 | Status: DISCONTINUED | OUTPATIENT
Start: 2025-01-30 | End: 2025-01-30

## 2025-01-30 RX ORDER — HYDROMORPHONE HYDROCHLORIDE 4 MG/ML
2 INJECTION, SOLUTION INTRAMUSCULAR; INTRAVENOUS; SUBCUTANEOUS ONCE
Refills: 0 | Status: DISCONTINUED | OUTPATIENT
Start: 2025-01-30 | End: 2025-01-30

## 2025-01-30 RX ORDER — MAGNESIUM SULFATE 0.8 MEQ/ML
2 AMPUL (ML) INJECTION ONCE
Refills: 0 | Status: COMPLETED | OUTPATIENT
Start: 2025-01-30 | End: 2025-01-30

## 2025-01-30 RX ORDER — TRAMADOL HYDROCHLORIDE 100 MG/1
50 TABLET, EXTENDED RELEASE ORAL EVERY 6 HOURS
Refills: 0 | Status: DISCONTINUED | OUTPATIENT
Start: 2025-01-30 | End: 2025-01-30

## 2025-01-30 RX ORDER — HYDROMORPHONE HYDROCHLORIDE 4 MG/ML
0.5 INJECTION, SOLUTION INTRAMUSCULAR; INTRAVENOUS; SUBCUTANEOUS EVERY 6 HOURS
Refills: 0 | Status: DISCONTINUED | OUTPATIENT
Start: 2025-01-30 | End: 2025-01-30

## 2025-01-30 RX ORDER — TRAMADOL HYDROCHLORIDE 100 MG/1
1 TABLET, EXTENDED RELEASE ORAL
Qty: 12 | Refills: 0
Start: 2025-01-30 | End: 2025-02-01

## 2025-01-30 RX ORDER — POLYETHYLENE GLYCOL 3350 17 G/17G
17 POWDER, FOR SOLUTION ORAL ONCE
Refills: 0 | Status: COMPLETED | OUTPATIENT
Start: 2025-01-30 | End: 2025-01-30

## 2025-01-30 RX ORDER — HYDROMORPHONE HYDROCHLORIDE 4 MG/ML
0.5 INJECTION, SOLUTION INTRAMUSCULAR; INTRAVENOUS; SUBCUTANEOUS ONCE
Refills: 0 | Status: DISCONTINUED | OUTPATIENT
Start: 2025-01-30 | End: 2025-01-30

## 2025-01-30 RX ORDER — TIRZEPATIDE 12.5 MG/.5ML
2.5 INJECTION, SOLUTION SUBCUTANEOUS
Refills: 0 | DISCHARGE

## 2025-01-30 RX ORDER — IBUPROFEN 600 MG/1
400 TABLET, FILM COATED ORAL EVERY 6 HOURS
Refills: 0 | Status: DISCONTINUED | OUTPATIENT
Start: 2025-01-30 | End: 2025-01-30

## 2025-01-30 RX ORDER — ACETAMINOPHEN 160 MG/5ML
325 SUSPENSION ORAL EVERY 6 HOURS
Refills: 0 | Status: DISCONTINUED | OUTPATIENT
Start: 2025-01-30 | End: 2025-01-30

## 2025-01-30 RX ORDER — OXYCODONE HYDROCHLORIDE AND ACETAMINOPHEN 5; 325 MG/1; MG/1
2 TABLET ORAL EVERY 6 HOURS
Refills: 0 | Status: DISCONTINUED | OUTPATIENT
Start: 2025-01-30 | End: 2025-01-30

## 2025-01-30 RX ADMIN — PANTOPRAZOLE 40 MILLIGRAM(S): 20 TABLET, DELAYED RELEASE ORAL at 07:33

## 2025-01-30 RX ADMIN — HYDROMORPHONE HYDROCHLORIDE 0.5 MILLIGRAM(S): 4 INJECTION, SOLUTION INTRAMUSCULAR; INTRAVENOUS; SUBCUTANEOUS at 10:40

## 2025-01-30 RX ADMIN — HYDROMORPHONE HYDROCHLORIDE 0.5 MILLIGRAM(S): 4 INJECTION, SOLUTION INTRAMUSCULAR; INTRAVENOUS; SUBCUTANEOUS at 06:18

## 2025-01-30 RX ADMIN — HYDROMORPHONE HYDROCHLORIDE 0.5 MILLIGRAM(S): 4 INJECTION, SOLUTION INTRAMUSCULAR; INTRAVENOUS; SUBCUTANEOUS at 05:48

## 2025-01-30 RX ADMIN — HYDROMORPHONE HYDROCHLORIDE 0.25 MILLIGRAM(S): 4 INJECTION, SOLUTION INTRAMUSCULAR; INTRAVENOUS; SUBCUTANEOUS at 03:35

## 2025-01-30 RX ADMIN — HYDROMORPHONE HYDROCHLORIDE 0.5 MILLIGRAM(S): 4 INJECTION, SOLUTION INTRAMUSCULAR; INTRAVENOUS; SUBCUTANEOUS at 10:02

## 2025-01-30 RX ADMIN — HYDROMORPHONE HYDROCHLORIDE 0.25 MILLIGRAM(S): 4 INJECTION, SOLUTION INTRAMUSCULAR; INTRAVENOUS; SUBCUTANEOUS at 02:28

## 2025-01-30 RX ADMIN — ACETAMINOPHEN 650 MILLIGRAM(S): 160 SUSPENSION ORAL at 05:53

## 2025-01-30 RX ADMIN — TRAMADOL HYDROCHLORIDE 50 MILLIGRAM(S): 100 TABLET, EXTENDED RELEASE ORAL at 13:40

## 2025-01-30 RX ADMIN — POLYETHYLENE GLYCOL 3350 17 GRAM(S): 17 POWDER, FOR SOLUTION ORAL at 11:36

## 2025-01-30 RX ADMIN — TRAMADOL HYDROCHLORIDE 50 MILLIGRAM(S): 100 TABLET, EXTENDED RELEASE ORAL at 13:14

## 2025-01-30 RX ADMIN — Medication 25 GRAM(S): at 10:01

## 2025-01-30 NOTE — DISCHARGE NOTE NURSING/CASE MANAGEMENT/SOCIAL WORK - FINANCIAL ASSISTANCE
[facility] provides services at a reduced cost to those who are determined to be eligible through [facility]’s financial assistance program. Information regarding [facility]’s financial assistance program can be found by going to [email or web address] or by calling [phone].

## 2025-01-30 NOTE — PROGRESS NOTE ADULT - ASSESSMENT
54 y. o female with PMH of Anxiety, depression, hypothyroidism , Insulin Resistance syndrome ( as per patient ), Breast cancer s/p mastectomy and reconstruction presenting with abdominal pain. Patient has had episodic abdominal pain for last 2 months with 4 episodes. She believes the episodes are increasing in frequency with 2 in the last week(one last weekend and one today). Her pain started around 5 pm and is centered in the upper abdomen with radiation to the back like it always has. She had moderate relief with tylenol although her pain has rebounded while she has been in the ED.She is nauseous but has not vomited. In the ED, pt afebrile, nontachycardic, normotensive, and satting on RA. Labs significant for Transamnitis with alk phos 154, , ALT 56 although Tbili wnl(.3). CTAP with no abd pathology, possible cystitis. Admitted to surgery for further work-up    NPO/IVF  Pain/Nausea PRN  EGD  Hepatitis panel, Anti-Smooth Muscle AB, GGT  Lovenox/SCDs  IS/OOBA   AM Labs
54 y. o female with PMH of Anxiety, depression, hypothyroidism , Insulin Resistance syndrome ( as per patient ), Breast cancer s/p mastectomy and reconstruction presenting with abdominal pain. Patient has had episodic abdominal pain for last 2 months with 4 episodes. She believes the episodes are increasing in frequency with 2 in the last week(one last weekend and one today). Her pain started around 5 pm and is centered in the upper abdomen with radiation to the back like it always has. She had moderate relief with tylenol although her pain has rebounded while she has been in the ED.She is nauseous but has not vomited. In the ED, pt afebrile, nontachycardic, normotensive, and satting on RA. Labs significant for Transamnitis with alk phos 154, , ALT 56 although Tbili wnl(.3). CTAP with no abd pathology, possible cystitis. Admitted to surgery for further work-up    Reg/IVF  Pain/Nausea PRN  MRCP  Hepatitis panel, Anti-Smooth Muscle AB, GGT  Lovenox/SCDs  IS/OOBA   AM Labs
54 y. o female with PMH of Anxiety, depression, hypothyroidism , Insulin Resistance syndrome ( as per patient ), Breast cancer s/p mastectomy and reconstruction presenting with abdominal pain. Patient has had episodic abdominal pain for last 2 months with 4 episodes. She believes the episodes are increasing in frequency with 2 in the last week(one last weekend and one today). Her pain started around 5 pm and is centered in the upper abdomen with radiation to the back like it always has. She had moderate relief with tylenol although her pain has rebounded while she has been in the ED.She is nauseous but has not vomited. In the ED, pt afebrile, nontachycardic, normotensive, and satting on RA. Labs significant for Transamnitis with alk phos 154, , ALT 56 although Tbili wnl(.3). CTAP with no abd pathology, possible cystitis. Admitted to surgery for further work-up. Now s/p lap monica 1/29.    Reg  Pain/Nausea PRN  MRCP  Hepatitis panel, Anti-Smooth Muscle AB, GGT  Lovenox/SCDs  IS/OOBA   AM Labs
54 y. o female with PMH of Anxiety, depression, hypothyroidism , Insulin Resistance syndrome ( as per patient ), Breast cancer s/p mastectomy and reconstruction presenting with abdominal pain. Patient has had episodic abdominal pain for last 2 months with 4 episodes. She believes the episodes are increasing in frequency with 2 in the last week(one last weekend and one today). Her pain started around 5 pm and is centered in the upper abdomen with radiation to the back like it always has. She had moderate relief with tylenol although her pain has rebounded while she has been in the ED.She is nauseous but has not vomited. In the ED, pt afebrile, nontachycardic, normotensive, and satting on RA. Labs significant for Transamnitis with alk phos 154, , ALT 56 although Tbili wnl(.3). CTAP with no abd pathology, possible cystitis. Admitted to surgery for further work-up    NPO except meds/IVF  Pain/Nausea PRN  HIDA w/CCK  MRCP  IGG4, Hepatitis panel, Anti-Smooth Muscle AB,  GGT  Lovenox/SCDs  IS/OOBA   AM Labs
54 y. o female with PMH of Anxiety, depression, hypothyroidism , Insulin Resistance syndrome ( as per patient ), Breast cancer s/p mastectomy and reconstruction presenting with abdominal pain. Patient has had episodic abdominal pain for last 2 months with 4 episodes. She believes the episodes are increasing in frequency with 2 in the last week(one last weekend and one today). Her pain started around 5 pm and is centered in the upper abdomen with radiation to the back like it always has. She had moderate relief with tylenol although her pain has rebounded while she has been in the ED.She is nauseous but has not vomited. In the ED, pt afebrile, nontachycardic, normotensive, and satting on RA. Labs significant for Transamnitis with alk phos 154, , ALT 56 although Tbili wnl(.3). CTAP with no abd pathology, possible cystitis. Admitted to surgery for further work-up. Now s/p lap monica 1/29, doing well but continues to have some difficulties with pain control.     Reg  Pain/Nausea PRN  F/u GI studies: Hepatitis panel, Anti-Smooth Muscle AB, GGT  Lovenox/SCDs  IS/OOBA   AM Labs

## 2025-01-30 NOTE — PROGRESS NOTE ADULT - SUBJECTIVE AND OBJECTIVE BOX
INTERVAL HPI/OVERNIGHT EVENTS: poc wnl, c/o severe pain with pain regimen, given .5 dilaudid    STATUS POST: betsy anderson    POST OPERATIVE DAY #: 1    SUBJECTIVE: Pt seen and examined by chief resident. Pt is doing well, resting comfortably on bed. Pain controlled. Diet tolerated. Ambulating out of bed. +F/+BM. No nausea or vomiting. No complaints at this time.    MEDICATIONS  (STANDING):  carBAMazepine 200 milliGRAM(s) Oral daily  ibuprofen  Tablet. 400 milliGRAM(s) Oral every 6 hours  lactated ringers. 1000 milliLiter(s) (85 mL/Hr) IV Continuous <Continuous>  melatonin 5 milliGRAM(s) Oral at bedtime  pantoprazole    Tablet 40 milliGRAM(s) Oral before breakfast  venlafaxine 50 milliGRAM(s) Oral daily    MEDICATIONS  (PRN):  acetaminophen     Tablet .. 325 milliGRAM(s) Oral every 6 hours PRN Mild Pain (1 - 3)  ALPRAZolam 0.5 milliGRAM(s) Oral at bedtime PRN Insomnia  HYDROmorphone  Injectable 0.5 milliGRAM(s) IV Push every 6 hours PRN breakthrough pain  lidocaine   4% Patch 1 Patch Transdermal once PRN Back pain  ondansetron Injectable 4 milliGRAM(s) IV Push every 6 hours PRN Nausea and/or Vomiting  oxycodone    5 mG/acetaminophen 325 mG 2 Tablet(s) Oral every 6 hours PRN Severe Pain (7 - 10)      Vital Signs Last 24 Hrs  T(C): 36.8 (30 Jan 2025 08:50), Max: 37.3 (29 Jan 2025 20:32)  T(F): 98.2 (30 Jan 2025 08:50), Max: 99.2 (29 Jan 2025 20:32)  HR: 77 (30 Jan 2025 08:50) (72 - 95)  BP: 126/81 (30 Jan 2025 08:50) (94/62 - 135/88)  BP(mean): 79 (30 Jan 2025 02:29) (79 - 106)  RR: 18 (30 Jan 2025 08:50) (8 - 19)  SpO2: 97% (30 Jan 2025 08:50) (95% - 100%)    Parameters below as of 30 Jan 2025 08:50  Patient On (Oxygen Delivery Method): room air        PHYSICAL EXAM:  General: Patient is doing well and lying in bed comfortably  Constitutional: alert and oriented   Pulm: Nonlabored breathing, no respiratory distress  CV: Regular rate and rhythm, normal sinus rhythm  Abd:  soft, nontender, nondistended. No rebound, no guarding.             Incisions: clean, dry, intact and healing well, no erythema/induration/edema  Extremities: warm, well perfused, no edema            I&O's Detail    29 Jan 2025 07:01  -  30 Jan 2025 07:00  --------------------------------------------------------  IN:    Lactated Ringers: 340 mL  Total IN: 340 mL    OUT:    Voided (mL): 1450 mL  Total OUT: 1450 mL    Total NET: -1110 mL      30 Jan 2025 07:01  -  30 Jan 2025 12:55  --------------------------------------------------------  IN:    Lactated Ringers: 510 mL    Oral Fluid: 350 mL  Total IN: 860 mL    OUT:    Voided (mL): 250 mL  Total OUT: 250 mL    Total NET: 610 mL          LABS:                        11.0   7.64  )-----------( 192      ( 30 Jan 2025 07:58 )             35.4     01-30    139  |  100  |  6[L]  ----------------------------<  94  3.8   |  30  |  0.62    Ca    8.7      30 Jan 2025 07:58  Phos  2.6     01-30  Mg     1.6     01-30    TPro  6.1  /  Alb  3.6  /  TBili  0.3  /  DBili  0.1  /  AST  46[H]  /  ALT  68[H]  /  AlkPhos  84  01-30      Urinalysis Basic - ( 30 Jan 2025 07:58 )    Color: x / Appearance: x / SG: x / pH: x  Gluc: 94 mg/dL / Ketone: x  / Bili: x / Urobili: x   Blood: x / Protein: x / Nitrite: x   Leuk Esterase: x / RBC: x / WBC x   Sq Epi: x / Non Sq Epi: x / Bacteria: x        RADIOLOGY & ADDITIONAL STUDIES: INTERVAL HPI/OVERNIGHT EVENTS: poc wnl, c/o severe pain with pain regimen, given .5 dilaudid    STATUS POST: lap [facility]    POST OPERATIVE DAY #: 1    SUBJECTIVE: Pt seen and examined by chief resident. Pt is doing well, resting on bed. Reports poor sleep and difficulty with pain control. Diet tolerated. No nausea or vomiting.     MEDICATIONS  (STANDING):  carBAMazepine 200 milliGRAM(s) Oral daily  ibuprofen  Tablet. 400 milliGRAM(s) Oral every 6 hours  lactated ringers. 1000 milliLiter(s) (85 mL/Hr) IV Continuous <Continuous>  melatonin 5 milliGRAM(s) Oral at bedtime  pantoprazole    Tablet 40 milliGRAM(s) Oral before breakfast  venlafaxine 50 milliGRAM(s) Oral daily    MEDICATIONS  (PRN):  acetaminophen     Tablet .. 325 milliGRAM(s) Oral every 6 hours PRN Mild Pain (1 - 3)  ALPRAZolam 0.5 milliGRAM(s) Oral at bedtime PRN Insomnia  HYDROmorphone  Injectable 0.5 milliGRAM(s) IV Push every 6 hours PRN breakthrough pain  lidocaine   4% Patch 1 Patch Transdermal once PRN Back pain  ondansetron Injectable 4 milliGRAM(s) IV Push every 6 hours PRN Nausea and/or Vomiting  oxycodone    5 mG/acetaminophen 325 mG 2 Tablet(s) Oral every 6 hours PRN Severe Pain (7 - 10)      Vital Signs Last 24 Hrs  T(C): 36.8 (30 Jan 2025 08:50), Max: 37.3 (29 Jan 2025 20:32)  T(F): 98.2 (30 Jan 2025 08:50), Max: 99.2 (29 Jan 2025 20:32)  HR: 77 (30 Jan 2025 08:50) (72 - 95)  BP: 126/81 (30 Jan 2025 08:50) (94/62 - 135/88)  BP(mean): 79 (30 Jan 2025 02:29) (79 - 106)  RR: 18 (30 Jan 2025 08:50) (8 - 19)  SpO2: 97% (30 Jan 2025 08:50) (95% - 100%)    Parameters below as of 30 Jan 2025 08:50  Patient On (Oxygen Delivery Method): room air        PHYSICAL EXAM:  General: Patient is doing well and lying in bed comfortably  Constitutional: alert and oriented   Pulm: Nonlabored breathing, no respiratory distress  CV: Regular rate and rhythm, normal sinus rhythm  Abd:  soft, nondistended, mild incisional tenderness.            Incisions: clean, dry, intact and healing well, no erythema/induration/edema  Extremities: warm, well perfused, no edema            I&O's Detail    29 Jan 2025 07:01  -  30 Jan 2025 07:00  --------------------------------------------------------  IN:    Lactated Ringers: 340 mL  Total IN: 340 mL    OUT:    Voided (mL): 1450 mL  Total OUT: 1450 mL    Total NET: -1110 mL      30 Jan 2025 07:01  -  30 Jan 2025 12:55  --------------------------------------------------------  IN:    Lactated Ringers: 510 mL    Oral Fluid: 350 mL  Total IN: 860 mL    OUT:    Voided (mL): 250 mL  Total OUT: 250 mL    Total NET: 610 mL          LABS:                        11.0   7.64  )-----------( 192      ( 30 Jan 2025 07:58 )             35.4     01-30    139  |  100  |  6[L]  ----------------------------<  94  3.8   |  30  |  0.62    Ca    8.7      30 Jan 2025 07:58  Phos  2.6     01-30  Mg     1.6     01-30    TPro  6.1  /  Alb  3.6  /  TBili  0.3  /  DBili  0.1  /  AST  46[H]  /  ALT  68[H]  /  AlkPhos  84  01-30      Urinalysis Basic - ( 30 Jan 2025 07:58 )    Color: x / Appearance: x / SG: x / pH: x  Gluc: 94 mg/dL / Ketone: x  / Bili: x / Urobili: x   Blood: x / Protein: x / Nitrite: x   Leuk Esterase: x / RBC: x / WBC x   Sq Epi: x / Non Sq Epi: x / Bacteria: x        RADIOLOGY & ADDITIONAL STUDIES:

## 2025-01-30 NOTE — DISCHARGE NOTE NURSING/CASE MANAGEMENT/SOCIAL WORK - PATIENT PORTAL LINK FT
You can access the FollowMyHealth Patient Portal offered by [facility] by registering at the following website: [email or web address]. By joining [facility]’s FollowMyHealth portal, you will also be able to view your health information using other applications (apps) compatible with our system.

## 2025-01-30 NOTE — DISCHARGE NOTE NURSING/CASE MANAGEMENT/SOCIAL WORK - NSDCFUADDAPPT_GEN_ALL_CORE_FT
Please schedule a follow-up appointment with Dr. Qureshi within 1-2 weeks of discharge from the hospital. You may reach his office at [phone] at your earliest convenience.

## 2025-02-07 DIAGNOSIS — F41.8 OTHER SPECIFIED ANXIETY DISORDERS: ICD-10-CM

## 2025-02-07 DIAGNOSIS — Z85.3 PERSONAL HISTORY OF MALIGNANT NEOPLASM OF BREAST: ICD-10-CM

## 2025-02-07 DIAGNOSIS — R74.01 ELEVATION OF LEVELS OF LIVER TRANSAMINASE LEVELS: ICD-10-CM

## 2025-02-07 DIAGNOSIS — Z88.0 ALLERGY STATUS TO PENICILLIN: ICD-10-CM

## 2025-02-07 DIAGNOSIS — E88.819 INSULIN RESISTANCE, UNSPECIFIED: ICD-10-CM

## 2025-02-07 DIAGNOSIS — M81.0 AGE-RELATED OSTEOPOROSIS WITHOUT CURRENT PATHOLOGICAL FRACTURE: ICD-10-CM

## 2025-02-07 DIAGNOSIS — Z79.899 OTHER LONG TERM (CURRENT) DRUG THERAPY: ICD-10-CM

## 2025-02-07 DIAGNOSIS — K31.89 OTHER DISEASES OF STOMACH AND DUODENUM: ICD-10-CM

## 2025-02-07 DIAGNOSIS — N30.90 CYSTITIS, UNSPECIFIED WITHOUT HEMATURIA: ICD-10-CM

## 2025-02-07 DIAGNOSIS — E03.9 HYPOTHYROIDISM, UNSPECIFIED: ICD-10-CM

## 2025-02-07 DIAGNOSIS — K80.50 CALCULUS OF BILE DUCT WITHOUT CHOLANGITIS OR CHOLECYSTITIS WITHOUT OBSTRUCTION: ICD-10-CM

## 2025-02-12 NOTE — H&P PST ADULT - PRIMARY CARE PROVIDER
Dr Lyle Nguyen  [phone] Dr Lyle Nguyen  [phone]

## 2025-02-18 ENCOUNTER — APPOINTMENT (OUTPATIENT)
Dept: SURGICAL ONCOLOGY | Facility: CLINIC | Age: 55
End: 2025-02-18
Payer: COMMERCIAL

## 2025-02-18 VITALS
HEART RATE: 77 BPM | RESPIRATION RATE: 16 BRPM | TEMPERATURE: 97.9 F | HEIGHT: 62 IN | DIASTOLIC BLOOD PRESSURE: 70 MMHG | WEIGHT: 101 LBS | OXYGEN SATURATION: 98 % | BODY MASS INDEX: 18.58 KG/M2 | SYSTOLIC BLOOD PRESSURE: 104 MMHG

## 2025-02-18 DIAGNOSIS — Z90.49 ACQUIRED ABSENCE OF OTHER SPECIFIED PARTS OF DIGESTIVE TRACT: ICD-10-CM

## 2025-02-18 PROCEDURE — 99024 POSTOP FOLLOW-UP VISIT: CPT

## 2025-05-22 ENCOUNTER — NON-APPOINTMENT (OUTPATIENT)
Age: 55
End: 2025-05-22

## (undated) DEVICE — SOL IRR POUR H2O 250ML

## (undated) DEVICE — BLADE SURGICAL #11 CARBON

## (undated) DEVICE — SUT VICRYL 4-0 2" RB-1

## (undated) DEVICE — GLV 8 PROTEXIS (WHITE)

## (undated) DEVICE — SUT MONOSOF 4-0 18" P-13 UNDYED

## (undated) DEVICE — ENDOCATCH 10MM

## (undated) DEVICE — DRSG DERMABOND 0.7ML

## (undated) DEVICE — STAPLER SKIN VISI-STAT 35 WIDE

## (undated) DEVICE — ONETRAC LIGHTED RETRACTOR 135 X 30MM DISP

## (undated) DEVICE — Device

## (undated) DEVICE — DRAPE TOWEL BLUE 17" X 24"

## (undated) DEVICE — HANDLE ETHICON ENDOPATH PROBE PLUS II PISTOL GRIP 5MM

## (undated) DEVICE — SYR LUER LOK 10CC

## (undated) DEVICE — SOL INJ LR 500ML

## (undated) DEVICE — BLADE SCALPEL SAFETYLOCK #15

## (undated) DEVICE — DRSG MAMMARY SUPPORT SM SIZE 1

## (undated) DEVICE — DRSG MAMMARY SUPPORT MED SIZE 2

## (undated) DEVICE — LAPAROSCOPIC ENDO FLO IRRIGATOR DISP

## (undated) DEVICE — PACK BASIN SPECIAL PROCEDURE

## (undated) DEVICE — D HELP - CLEARVIEW CLEARIFY SYSTEM

## (undated) DEVICE — DRAPE MAYO STAND 30"

## (undated) DEVICE — WARMING BLANKET FULL UNDERBODY

## (undated) DEVICE — DRAIN RESERVOIR FOR JACKSON PRATT 100CC CARDINAL

## (undated) DEVICE — DRSG MAMMARY SUPPORT LG SIZE 4

## (undated) DEVICE — CLIP APPLR DISP 5MM

## (undated) DEVICE — BLADE SCALPEL SAFETYLOCK #10

## (undated) DEVICE — GLV 7 PROTEXIS (WHITE)

## (undated) DEVICE — DRAPE INSTRUMENT POUCH 6.75" X 11"

## (undated) DEVICE — WARMING BLANKET LOWER ADULT

## (undated) DEVICE — SUT POLYSORB 3-0 30" V-20 UNDYED

## (undated) DEVICE — FORCEP RADIAL JAW 4 W NDL 2.2MM 2.8MM 240CM ORANGE DISP

## (undated) DEVICE — SUT POLYSORB 3-0 30" P-12 UNDYED

## (undated) DEVICE — SUT MONOCRYL 3-0 27" KS CS-1 UNDYED

## (undated) DEVICE — PACK GENERAL LAPAROSCOPY

## (undated) DEVICE — SPECIMEN CONTAINER 100ML

## (undated) DEVICE — DRSG MAMMARY SUPPORT XL SIZE 5

## (undated) DEVICE — DRSG DERMABOND PRINEO 60CM

## (undated) DEVICE — ELCTR SHAFT ETHICON  ENDOPATH PLUS II HOOK 5MM X 34CM

## (undated) DEVICE — PACK MINOR

## (undated) DEVICE — TIP METZENBAUM SCISSOR MONOPOLAR ENDOCUT (ORANGE)

## (undated) DEVICE — SYR LUER LOK 20CC

## (undated) DEVICE — DRSG MAMMARY SUPPORT MED SIZE 3

## (undated) DEVICE — ELCTR BOVIE TIP BLADE VALLEYLAB 6.5"

## (undated) DEVICE — SUT PROLENE 1 30" CT

## (undated) DEVICE — SOL IRR POUR NS 0.9% 500ML

## (undated) DEVICE — SUT POLYSORB 3-0 18" P-12 UNDYED

## (undated) DEVICE — SUT VICRYL 2-0 27" RB-1

## (undated) DEVICE — DRSG CURITY GAUZE SPONGE 4 X 4" 12-PLY

## (undated) DEVICE — DRAPE 1/2 SHEET 40X57"

## (undated) DEVICE — ELCTR BOVIE TIP BLADE INSULATED 2.75" EDGE

## (undated) DEVICE — VENODYNE/SCD SLEEVE CALF MEDIUM

## (undated) DEVICE — MEDICATION LABELS W MARKER

## (undated) DEVICE — BLADE SCALPEL SAFETY #15 WITH PLASTIC GREEN HANDLE

## (undated) DEVICE — ELCTR BOVIE PENCIL BLADE 10FT

## (undated) DEVICE — STAPLER COVIDIEN ENDO GIA STANDARD HANDLE

## (undated) DEVICE — TUBING ASPIRATION HI VAC LIPOSUCTION 8FT

## (undated) DEVICE — DRSG COMBINE 5X9"

## (undated) DEVICE — PACK BREAST RECONSTRUCTION

## (undated) DEVICE — PREP BETADINE KIT

## (undated) DEVICE — SWAB CHLORHEXIDINE GLUCONATE 1.6ML ISOPROPYL

## (undated) DEVICE — DRSG XEROFORM 5 X 9"

## (undated) DEVICE — SLEEVE APPLIED MEDICAL KII 5MM X100MM Z-THREAD

## (undated) DEVICE — SUT POLYSORB 2-0 30" V-20 UNDYED

## (undated) DEVICE — SUT PDS II 3-0 27" SH UNDYED

## (undated) DEVICE — BLADE SURGICAL #15 CARBON

## (undated) DEVICE — MARKING PEN W RULER

## (undated) DEVICE — SUT VICRYL 3-0 27" CT-1

## (undated) DEVICE — SUT VICRYL 3-0 27" SH

## (undated) DEVICE — TROCAR APPLIED MEDICAL KII FIOS FIRST ENTRY 5MM X 100MM Z-THREAD

## (undated) DEVICE — ABDOMINAL BINDER MED/LG 12" X 45"-62"

## (undated) DEVICE — DRSG MASTISOL

## (undated) DEVICE — LAP PAD 18 X 18"

## (undated) DEVICE — GLV 7.5 PROTEXIS (WHITE)

## (undated) DEVICE — TUBING PLUME AWAY 4.0

## (undated) DEVICE — DRAIN JACKSON PRATT 10MM FLAT FULL NO TROCAR

## (undated) DEVICE — POSITIONER FOAM EGG CRATE ULNAR 2PCS (PINK)

## (undated) DEVICE — ELCTR BOVIE TIP BLADE INSULATED 6.5" EDGE

## (undated) DEVICE — SUT MONOCRYL 4-0 27" PS-2 UNDYED

## (undated) DEVICE — SUT CHROMIC 5-0 18" P-13

## (undated) DEVICE — SUT VICRYL PLUS 2-0 36" CT UNDYED

## (undated) DEVICE — DISSECTOR ENDOSCOPIC KITTNER SINGLE TIP

## (undated) DEVICE — TUBING INFILTRATION

## (undated) DEVICE — ELCTR THUNDERBEAT HANDPIECE 5MM X 35CM FRONT CONTROL